# Patient Record
Sex: MALE | Race: OTHER | NOT HISPANIC OR LATINO | Employment: STUDENT | URBAN - METROPOLITAN AREA
[De-identification: names, ages, dates, MRNs, and addresses within clinical notes are randomized per-mention and may not be internally consistent; named-entity substitution may affect disease eponyms.]

---
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
## 2024-01-20 ENCOUNTER — APPOINTMENT (EMERGENCY)
Dept: RADIOLOGY | Facility: HOSPITAL | Age: 19
DRG: 720 | End: 2024-01-20
Payer: COMMERCIAL

## 2024-01-20 ENCOUNTER — HOSPITAL ENCOUNTER (INPATIENT)
Facility: HOSPITAL | Age: 19
LOS: 2 days | Discharge: HOME | DRG: 720 | End: 2024-01-22
Attending: EMERGENCY MEDICINE | Admitting: INTERNAL MEDICINE
Payer: COMMERCIAL

## 2024-01-20 DIAGNOSIS — R65.20 SEVERE SEPSIS (HCC): ICD-10-CM

## 2024-01-20 DIAGNOSIS — A41.9 SEVERE SEPSIS (HCC): ICD-10-CM

## 2024-01-20 DIAGNOSIS — E87.6 HYPOKALEMIA: ICD-10-CM

## 2024-01-20 DIAGNOSIS — J13 PNEUMONIA OF RIGHT MIDDLE LOBE DUE TO STREPTOCOCCUS PNEUMONIAE (HCC): ICD-10-CM

## 2024-01-20 DIAGNOSIS — J18.9 PNEUMONIA: Primary | ICD-10-CM

## 2024-01-20 LAB
ALBUMIN SERPL BCP-MCNC: 4.3 G/DL (ref 3.5–5)
ALP SERPL-CCNC: 89 U/L (ref 34–104)
ALT SERPL W P-5'-P-CCNC: 35 U/L (ref 7–52)
ANION GAP SERPL CALCULATED.3IONS-SCNC: 8 MMOL/L
APTT PPP: 30 SECONDS (ref 23–37)
AST SERPL W P-5'-P-CCNC: 23 U/L (ref 13–39)
BASOPHILS # BLD AUTO: 0.05 THOUSANDS/ÂΜL (ref 0–0.1)
BASOPHILS NFR BLD AUTO: 0 % (ref 0–1)
BILIRUB SERPL-MCNC: 0.81 MG/DL (ref 0.2–1)
BILIRUB UR QL STRIP: NEGATIVE
BUN SERPL-MCNC: 18 MG/DL (ref 5–25)
CALCIUM SERPL-MCNC: 8.8 MG/DL (ref 8.4–10.2)
CHLORIDE SERPL-SCNC: 107 MMOL/L (ref 96–108)
CLARITY UR: CLEAR
CO2 SERPL-SCNC: 22 MMOL/L (ref 21–32)
COLOR UR: NORMAL
CREAT SERPL-MCNC: 0.93 MG/DL (ref 0.6–1.3)
EOSINOPHIL # BLD AUTO: 0.01 THOUSAND/ÂΜL (ref 0–0.61)
EOSINOPHIL NFR BLD AUTO: 0 % (ref 0–6)
ERYTHROCYTE [DISTWIDTH] IN BLOOD BY AUTOMATED COUNT: 12.2 % (ref 11.6–15.1)
FLUAV RNA RESP QL NAA+PROBE: NEGATIVE
FLUBV RNA RESP QL NAA+PROBE: NEGATIVE
GFR SERPL CREATININE-BSD FRML MDRD: 117 ML/MIN/1.73SQ M
GLUCOSE SERPL-MCNC: 134 MG/DL (ref 65–140)
GLUCOSE UR STRIP-MCNC: NEGATIVE MG/DL
HCT VFR BLD AUTO: 37.1 % (ref 36.5–49.3)
HGB BLD-MCNC: 12.9 G/DL (ref 12–17)
HGB UR QL STRIP.AUTO: NEGATIVE
IMM GRANULOCYTES # BLD AUTO: 0.32 THOUSAND/UL (ref 0–0.2)
IMM GRANULOCYTES NFR BLD AUTO: 1 % (ref 0–2)
INR PPP: 1.2 (ref 0.84–1.19)
KETONES UR STRIP-MCNC: NEGATIVE MG/DL
LACTATE SERPL-SCNC: 0.9 MMOL/L (ref 0.5–2)
LEUKOCYTE ESTERASE UR QL STRIP: NEGATIVE
LYMPHOCYTES # BLD AUTO: 1.16 THOUSANDS/ÂΜL (ref 0.6–4.47)
LYMPHOCYTES NFR BLD AUTO: 5 % (ref 14–44)
MCH RBC QN AUTO: 30.8 PG (ref 26.8–34.3)
MCHC RBC AUTO-ENTMCNC: 34.8 G/DL (ref 31.4–37.4)
MCV RBC AUTO: 89 FL (ref 82–98)
MONOCYTES # BLD AUTO: 2.14 THOUSAND/ÂΜL (ref 0.17–1.22)
MONOCYTES NFR BLD AUTO: 10 % (ref 4–12)
NEUTROPHILS # BLD AUTO: 18.84 THOUSANDS/ÂΜL (ref 1.85–7.62)
NEUTS SEG NFR BLD AUTO: 84 % (ref 43–75)
NITRITE UR QL STRIP: NEGATIVE
NRBC BLD AUTO-RTO: 0 /100 WBCS
PH UR STRIP.AUTO: 7.5 [PH]
PLATELET # BLD AUTO: 389 THOUSANDS/UL (ref 149–390)
PMV BLD AUTO: 9.5 FL (ref 8.9–12.7)
POTASSIUM SERPL-SCNC: 3.2 MMOL/L (ref 3.5–5.3)
PROCALCITONIN SERPL-MCNC: 2.09 NG/ML
PROT SERPL-MCNC: 7.2 G/DL (ref 6.4–8.4)
PROT UR STRIP-MCNC: NEGATIVE MG/DL
PROTHROMBIN TIME: 15.5 SECONDS (ref 11.6–14.5)
RBC # BLD AUTO: 4.19 MILLION/UL (ref 3.88–5.62)
RSV RNA RESP QL NAA+PROBE: NEGATIVE
SARS-COV-2 RNA RESP QL NAA+PROBE: NEGATIVE
SODIUM SERPL-SCNC: 137 MMOL/L (ref 135–147)
SP GR UR STRIP.AUTO: 1.02 (ref 1–1.03)
UROBILINOGEN UR STRIP-ACNC: <2 MG/DL
WBC # BLD AUTO: 22.52 THOUSAND/UL (ref 4.31–10.16)

## 2024-01-20 PROCEDURE — 85730 THROMBOPLASTIN TIME PARTIAL: CPT | Performed by: PHYSICIAN ASSISTANT

## 2024-01-20 PROCEDURE — 85610 PROTHROMBIN TIME: CPT | Performed by: PHYSICIAN ASSISTANT

## 2024-01-20 PROCEDURE — 83605 ASSAY OF LACTIC ACID: CPT | Performed by: PHYSICIAN ASSISTANT

## 2024-01-20 PROCEDURE — 71046 X-RAY EXAM CHEST 2 VIEWS: CPT

## 2024-01-20 PROCEDURE — 80053 COMPREHEN METABOLIC PANEL: CPT | Performed by: PHYSICIAN ASSISTANT

## 2024-01-20 PROCEDURE — 87449 NOS EACH ORGANISM AG IA: CPT | Performed by: PHYSICIAN ASSISTANT

## 2024-01-20 PROCEDURE — 99285 EMERGENCY DEPT VISIT HI MDM: CPT

## 2024-01-20 PROCEDURE — 0241U HB NFCT DS VIR RESP RNA 4 TRGT: CPT | Performed by: PHYSICIAN ASSISTANT

## 2024-01-20 PROCEDURE — 96365 THER/PROPH/DIAG IV INF INIT: CPT

## 2024-01-20 PROCEDURE — 87040 BLOOD CULTURE FOR BACTERIA: CPT | Performed by: PHYSICIAN ASSISTANT

## 2024-01-20 PROCEDURE — 85025 COMPLETE CBC W/AUTO DIFF WBC: CPT | Performed by: PHYSICIAN ASSISTANT

## 2024-01-20 PROCEDURE — 99285 EMERGENCY DEPT VISIT HI MDM: CPT | Performed by: PHYSICIAN ASSISTANT

## 2024-01-20 PROCEDURE — 36415 COLL VENOUS BLD VENIPUNCTURE: CPT | Performed by: PHYSICIAN ASSISTANT

## 2024-01-20 PROCEDURE — 81003 URINALYSIS AUTO W/O SCOPE: CPT | Performed by: PHYSICIAN ASSISTANT

## 2024-01-20 PROCEDURE — 84145 PROCALCITONIN (PCT): CPT | Performed by: PHYSICIAN ASSISTANT

## 2024-01-20 PROCEDURE — 99223 1ST HOSP IP/OBS HIGH 75: CPT | Performed by: PHYSICIAN ASSISTANT

## 2024-01-20 RX ORDER — POTASSIUM CHLORIDE 20 MEQ/1
40 TABLET, EXTENDED RELEASE ORAL ONCE
Status: COMPLETED | OUTPATIENT
Start: 2024-01-20 | End: 2024-01-20

## 2024-01-20 RX ORDER — ONDANSETRON 2 MG/ML
4 INJECTION INTRAMUSCULAR; INTRAVENOUS EVERY 4 HOURS PRN
Status: DISCONTINUED | OUTPATIENT
Start: 2024-01-20 | End: 2024-01-22 | Stop reason: HOSPADM

## 2024-01-20 RX ORDER — CEFTRIAXONE 2 G/50ML
2000 INJECTION, SOLUTION INTRAVENOUS EVERY 24 HOURS
Status: DISCONTINUED | OUTPATIENT
Start: 2024-01-21 | End: 2024-01-22 | Stop reason: HOSPADM

## 2024-01-20 RX ORDER — IBUPROFEN 600 MG/1
600 TABLET ORAL EVERY 6 HOURS PRN
Status: DISCONTINUED | OUTPATIENT
Start: 2024-01-20 | End: 2024-01-22 | Stop reason: HOSPADM

## 2024-01-20 RX ORDER — ACETAMINOPHEN 325 MG/1
650 TABLET ORAL EVERY 6 HOURS PRN
Status: DISCONTINUED | OUTPATIENT
Start: 2024-01-20 | End: 2024-01-22 | Stop reason: HOSPADM

## 2024-01-20 RX ORDER — SODIUM CHLORIDE, SODIUM GLUCONATE, SODIUM ACETATE, POTASSIUM CHLORIDE, MAGNESIUM CHLORIDE, SODIUM PHOSPHATE, DIBASIC, AND POTASSIUM PHOSPHATE .53; .5; .37; .037; .03; .012; .00082 G/100ML; G/100ML; G/100ML; G/100ML; G/100ML; G/100ML; G/100ML
125 INJECTION, SOLUTION INTRAVENOUS CONTINUOUS
Status: DISPENSED | OUTPATIENT
Start: 2024-01-20 | End: 2024-01-21

## 2024-01-20 RX ORDER — CEFTRIAXONE 2 G/50ML
2000 INJECTION, SOLUTION INTRAVENOUS ONCE
Status: COMPLETED | OUTPATIENT
Start: 2024-01-20 | End: 2024-01-20

## 2024-01-20 RX ADMIN — CEFTRIAXONE 2000 MG: 2 INJECTION, SOLUTION INTRAVENOUS at 14:28

## 2024-01-20 RX ADMIN — IBUPROFEN 600 MG: 600 TABLET ORAL at 17:07

## 2024-01-20 RX ADMIN — SODIUM CHLORIDE 1000 ML: 0.9 INJECTION, SOLUTION INTRAVENOUS at 14:30

## 2024-01-20 RX ADMIN — SODIUM CHLORIDE 1000 ML: 0.9 INJECTION, SOLUTION INTRAVENOUS at 14:04

## 2024-01-20 RX ADMIN — SODIUM CHLORIDE, SODIUM GLUCONATE, SODIUM ACETATE, POTASSIUM CHLORIDE, MAGNESIUM CHLORIDE, SODIUM PHOSPHATE, DIBASIC, AND POTASSIUM PHOSPHATE 125 ML/HR: .53; .5; .37; .037; .03; .012; .00082 INJECTION, SOLUTION INTRAVENOUS at 17:16

## 2024-01-20 RX ADMIN — POTASSIUM CHLORIDE 40 MEQ: 1500 TABLET, EXTENDED RELEASE ORAL at 16:30

## 2024-01-20 NOTE — ASSESSMENT & PLAN NOTE
Presented to the emergency department with a fever of 105.8 at home as well as cough and body aches  Dx with flu last week, now flu/covid/rsv negative  Continue IV rocephin  Legionella, strep pneumo antigens ordered  Blood cultures ordered  Sputum cultures ordered

## 2024-01-20 NOTE — PLAN OF CARE
Problem: METABOLIC, FLUID AND ELECTROLYTES - ADULT  Goal: Electrolytes maintained within normal limits  Description: INTERVENTIONS:  - Monitor labs and assess patient for signs and symptoms of electrolyte imbalances  - Administer electrolyte replacement as ordered  - Monitor response to electrolyte replacements, including repeat lab results as appropriate  - Instruct patient on fluid and nutrition as appropriate  Outcome: Progressing  Goal: Fluid balance maintained  Description: INTERVENTIONS:  - Monitor labs   - Monitor I/O and WT  - Instruct patient on fluid and nutrition as appropriate  - Assess for signs & symptoms of volume excess or deficit  Outcome: Progressing     Problem: Knowledge Deficit  Goal: Patient/family/caregiver demonstrates understanding of disease process, treatment plan, medications, and discharge instructions  Description: Complete learning assessment and assess knowledge base.  Interventions:  - Provide teaching at level of understanding  - Provide teaching via preferred learning methods  Outcome: Progressing

## 2024-01-20 NOTE — DISCHARGE INSTRUCTIONS
DISCHARGE INSTRUCTIONS:    FOLLOW UP WITH YOUR PRIMARY CARE PROVIDER OR THE [facility]. MAKE AN APPOINTMENT TO BE SEEN.     TAKE TYLENOL OR MOTRIN FOR PAIN OR FEVER.    TAKE ANTIBIOTICS AS PRESCRIBED. IF RASH, SHORTNESS OF BREATH OR TROUBLE SWALLOWING, STOP TAKING THE MEDICATION AND BE SEEN.     REST AND DRINK PLENTY OF FLUIDS.    IF SYMPTOMS WORSEN OR NEW SYMPTOMS ARISE, RETURN TO THE ER TO BE SEEN.

## 2024-01-20 NOTE — ED PROVIDER NOTES
History  Chief Complaint   Patient presents with    Fever     Arrives with family from home. Pt started with fever of 105 at home. Given tylenol, but threw up 30 min later. Dx with flu last week. Generalized body aches/chills     18y.o male with no significant PMH presents to the ER for fever (max 105.8), chills, cough, one episode of vomiting and body aches for 2 days. Patient took Tylenol and Advil around 10:00 today. Cough produces sputum. Symptoms are constant. Patient was diagnosed with the flu last week but was feeling better until symptoms worsened. He has positive sick contacts. He denies rhinorrhea/congestion, sore throat, chest pain, dyspnea, nausea, diarrhea, abdominal pain, urinary symptoms, weakness or paresthesias.      History provided by:  Patient   used: No        None       History reviewed. No pertinent past medical history.    History reviewed. No pertinent surgical history.    History reviewed. No pertinent family history.  I have reviewed and agree with the history as documented.    E-Cigarette/Vaping     E-Cigarette/Vaping Substances     Social History     Tobacco Use    Smoking status: Passive Smoke Exposure - Never Smoker    Smokeless tobacco: Never   Substance Use Topics    Alcohol use: Never    Drug use: Yes     Types: Marijuana       Review of Systems   Constitutional:  Positive for chills and fever. Negative for appetite change.   HENT:  Negative for congestion, drooling, ear discharge, ear pain, facial swelling, rhinorrhea and sore throat.    Eyes:  Negative for redness.   Respiratory:  Positive for cough. Negative for shortness of breath.    Cardiovascular:  Negative for chest pain.   Gastrointestinal:  Positive for vomiting. Negative for abdominal pain, diarrhea and nausea.   Genitourinary:  Negative for dysuria, frequency, hematuria and urgency.   Musculoskeletal:  Positive for myalgias. Negative for neck stiffness.   Skin:  Negative for rash.    Allergic/Immunologic: Negative for food allergies.   Neurological:  Negative for weakness and numbness.       Physical Exam  Physical Exam  Vitals and nursing note reviewed.   Constitutional:       General: He is not in acute distress.     Appearance: He is ill-appearing. He is not toxic-appearing.   HENT:      Head: Normocephalic and atraumatic.      Mouth/Throat:      Lips: Pink. No lesions.      Mouth: Mucous membranes are moist.   Eyes:      Conjunctiva/sclera: Conjunctivae normal.   Neck:      Trachea: No tracheal deviation.   Cardiovascular:      Rate and Rhythm: Regular rhythm. Tachycardia present.      Heart sounds: Normal heart sounds, S1 normal and S2 normal. No murmur heard.     No friction rub. No gallop.   Pulmonary:      Effort: Pulmonary effort is normal. No respiratory distress.      Breath sounds: Normal breath sounds. No decreased breath sounds, wheezing, rhonchi or rales.   Chest:      Chest wall: No tenderness.   Abdominal:      General: Bowel sounds are normal. There is no distension.      Palpations: Abdomen is soft.      Tenderness: There is no abdominal tenderness. There is no guarding or rebound.   Musculoskeletal:      Cervical back: Normal range of motion and neck supple.   Skin:     General: Skin is warm and dry.      Findings: No rash.   Neurological:      Mental Status: He is alert.      GCS: GCS eye subscore is 4. GCS verbal subscore is 5. GCS motor subscore is 6.   Psychiatric:         Mood and Affect: Mood normal.         Vital Signs  ED Triage Vitals   Temperature Pulse Respirations Blood Pressure SpO2   01/20/24 1111 01/20/24 1111 01/20/24 1111 01/20/24 1111 01/20/24 1111   (!) 103.1 °F (39.5 °C) (!) 112 20 130/56 95 %      Temp Source Heart Rate Source Patient Position - Orthostatic VS BP Location FiO2 (%)   01/20/24 1111 01/20/24 1111 01/20/24 1111 01/20/24 1111 --   Oral Monitor Sitting Right arm       Pain Score       01/20/24 1707       Med Not Given for Pain - for MAR use  only           Vitals:    01/20/24 1515 01/20/24 1545 01/20/24 1600 01/20/24 1648   BP: 97/53 103/61 98/56 118/59   Pulse: 80 102 92 97   Patient Position - Orthostatic VS:    Lying         Visual Acuity      ED Medications  Medications   acetaminophen (TYLENOL) tablet 650 mg (has no administration in time range)   cefTRIAXone (ROCEPHIN) IVPB (premix in dextrose) 2,000 mg 50 mL (has no administration in time range)   multi-electrolyte (PLASMALYTE-A/ISOLYTE-S PH 7.4) IV solution (125 mL/hr Intravenous New Bag 1/20/24 1716)   ibuprofen (MOTRIN) tablet 600 mg (600 mg Oral Given 1/20/24 1707)   ondansetron (ZOFRAN) injection 4 mg (has no administration in time range)   cefTRIAXone (ROCEPHIN) IVPB (premix in dextrose) 2,000 mg 50 mL (0 mg Intravenous Stopped 1/20/24 1458)   sodium chloride 0.9 % bolus 1,000 mL (0 mL Intravenous Stopped 1/20/24 1504)   sodium chloride 0.9 % bolus 1,000 mL (0 mL Intravenous Stopped 1/20/24 1530)   potassium chloride (K-DUR,KLOR-CON) CR tablet 40 mEq (40 mEq Oral Given 1/20/24 1630)       Diagnostic Studies  Results Reviewed       Procedure Component Value Units Date/Time    UA w Reflex to Microscopic w Reflex to Culture [[id number]] Collected: 01/20/24 1626    Lab Status: Final result Specimen: Urine, Clean Catch Updated: 01/20/24 1904     Color, UA Light Yellow     Clarity, UA Clear     Specific Gravity, UA 1.017     pH, UA 7.5     Leukocytes, UA Negative     Nitrite, UA Negative     Protein, UA Negative mg/dl      Glucose, UA Negative mg/dl      Ketones, UA Negative mg/dl      Urobilinogen, UA <2.0 mg/dl      Bilirubin, UA Negative     Occult Blood, UA Negative    Strep Pneumoniae, Urine [[id number]] Collected: 01/20/24 1625    Lab Status: In process Specimen: Urine, Clean Catch Updated: 01/20/24 1630    Legionella antigen, urine [[id number]] Collected: 01/20/24 1625    Lab Status: In process Specimen: Urine, Clean Catch Updated: 01/20/24 1629    Protime-INR [[id number]]  (Abnormal)  Collected: 01/20/24 1400    Lab Status: Final result Specimen: Blood from Arm, Right Updated: 01/20/24 1503     Protime 15.5 seconds      INR 1.20    APTT [[id number]]  (Normal) Collected: 01/20/24 1400    Lab Status: Final result Specimen: Blood from Arm, Right Updated: 01/20/24 1503     PTT 30 seconds     Procalcitonin [[id number]]  (Abnormal) Collected: 01/20/24 1400    Lab Status: Final result Specimen: Blood from Arm, Right Updated: 01/20/24 1449     Procalcitonin 2.09 ng/ml     Comprehensive metabolic panel [[id number]]  (Abnormal) Collected: 01/20/24 1400    Lab Status: Final result Specimen: Blood from Arm, Right Updated: 01/20/24 1442     Sodium 137 mmol/L      Potassium 3.2 mmol/L      Chloride 107 mmol/L      CO2 22 mmol/L      ANION GAP 8 mmol/L      BUN 18 mg/dL      Creatinine 0.93 mg/dL      Glucose 134 mg/dL      Calcium 8.8 mg/dL      AST 23 U/L      ALT 35 U/L      Alkaline Phosphatase 89 U/L      Total Protein 7.2 g/dL      Albumin 4.3 g/dL      Total Bilirubin 0.81 mg/dL      eGFR 117 ml/min/1.73sq m     Narrative:      National Kidney Disease Foundation guidelines for Chronic Kidney Disease (CKD):     Stage 1 with normal or high GFR (GFR > 90 mL/min/1.73 square meters)    Stage 2 Mild CKD (GFR = 60-89 mL/min/1.73 square meters)    Stage 3A Moderate CKD (GFR = 45-59 mL/min/1.73 square meters)    Stage 3B Moderate CKD (GFR = 30-44 mL/min/1.73 square meters)    Stage 4 Severe CKD (GFR = 15-29 mL/min/1.73 square meters)    Stage 5 End Stage CKD (GFR <15 mL/min/1.73 square meters)  Note: GFR calculation is accurate only with a steady state creatinine    Lactic acid [[id number]]  (Normal) Collected: 01/20/24 1400    Lab Status: Final result Specimen: Blood from Arm, Right Updated: 01/20/24 1441     LACTIC ACID 0.9 mmol/L     Narrative:      Result may be elevated if tourniquet was used during collection.    CBC and differential [[id number]]  (Abnormal) Collected: 01/20/24 1406    Lab Status: Final result  Specimen: Blood Updated: 01/20/24 1424     WBC 22.52 Thousand/uL      RBC 4.19 Million/uL      Hemoglobin 12.9 g/dL      Hematocrit 37.1 %      MCV 89 fL      MCH 30.8 pg      MCHC 34.8 g/dL      RDW 12.2 %      MPV 9.5 fL      Platelets 389 Thousands/uL      nRBC 0 /100 WBCs      Neutrophils Relative 84 %      Immat GRANS % 1 %      Lymphocytes Relative 5 %      Monocytes Relative 10 %      Eosinophils Relative 0 %      Basophils Relative 0 %      Neutrophils Absolute 18.84 Thousands/µL      Immature Grans Absolute 0.32 Thousand/uL      Lymphocytes Absolute 1.16 Thousands/µL      Monocytes Absolute 2.14 Thousand/µL      Eosinophils Absolute 0.01 Thousand/µL      Basophils Absolute 0.05 Thousands/µL     Blood culture #2 [[id number]] Collected: 01/20/24 1416    Lab Status: In process Specimen: Blood from Arm, Left Updated: 01/20/24 1420    Blood culture #1 [[id number]] Collected: 01/20/24 1400    Lab Status: In process Specimen: Blood from Arm, Right Updated: 01/20/24 1420    FLU/RSV/COVID - if FLU/RSV clinically relevant [[id number]]  (Normal) Collected: 01/20/24 1139    Lab Status: Final result Specimen: Nares from Nose Updated: 01/20/24 1224     SARS-CoV-2 Negative     INFLUENZA A PCR Negative     INFLUENZA B PCR Negative     RSV PCR Negative    Narrative:      FOR PEDIATRIC PATIENTS - copy/paste COVID Guidelines URL to browser: [email or web address]    SARS-CoV-2 assay is a Nucleic Acid Amplification assay intended for the  qualitative detection of nucleic acid from SARS-CoV-2 in nasopharyngeal  swabs. Results are for the presumptive identification of SARS-CoV-2 RNA.    Positive results are indicative of infection with SARS-CoV-2, the virus  causing COVID-19, but do not rule out bacterial infection or co-infection  with other viruses. Laboratories within the [place] and its  territories are required to report all positive results to the appropriate  public  health authorities. Negative results do not preclude SARS-CoV-2  infection and should not be used as the sole basis for treatment or other  patient management decisions. Negative results must be combined with  clinical observations, patient history, and epidemiological information.  This test has not been FDA cleared or approved.    This test has been authorized by FDA under an Emergency Use Authorization  (EUA). This test is only authorized for the duration of time the  declaration that circumstances exist justifying the authorization of the  emergency use of an in vitro diagnostic tests for detection of SARS-CoV-2  virus and/or diagnosis of COVID-19 infection under section 564(b)(1) of  the Act, 21 U.S.C. 360bbb-3(b)(1), unless the authorization is terminated  or revoked sooner. The test has been validated but independent review by FDA  and CLIA is pending.    Test performed using Cepheid GeneXpert: This RT-PCR assay targets N2,  a region unique to SARS-CoV-2. A conserved region in the E-gene was chosen  for pan-Sarbecovirus detection which includes SARS-CoV-2.    According to CMS-2020-01-R, this platform meets the definition of high-throughput technology.                   XR chest 2 views   ED Interpretation by Diana Guadalupe PA-C (01/20 1229)   RLL pneumonia seen by me                 Procedures  Procedures         ED Course  ED Course as of 01/20/24 1945   Sat Jan 20, 2024   1125 Offered labs and imaging for symptoms. Patient and family state they do not have insurance and want the least amount of testing to be done. After speaking with family, they are agreeable to covid/flu/rsv test and CXR for now. Will re-evaluate the situation once testing returns.    1331 Informed patient of covid/flu/rsv test and CXR findings of pneumonia. Patient reports feeling much better now that his temperature is coming down. Patient is tolerating gatorade in the room. Will recheck vitals in a little and ambulate patient.    1358 Blood Pressure(!): 74/51  RN attempted to obtain blood pressure and have patient ambulate. Patient became hypotensive and dizzy. He also became tachycardic. Family and patient now agreeable to labs and fluids. Will obtain.   1416 Septic work up ordered along with fluids and antibiotics. Will give patient 30mL/kg of fluids, which equals 1956mL when calculated.    1424 WBC(!): 22.52   1424 Hemoglobin: 12.9   1424 Platelet Count: 389   1442 LACTIC ACID: 0.9   1458 Procalcitonin(!): 2.09   1546 Informed patient and family of lab and imaging findings. Patient and family agreeable to plan for admission. [place] text sent to [facility].   1555 Sepsis alert called. Work up already in process. Antibiotics already given along with fluids.     1558 Potassium(!): 3.2  Will replete.                            Initial Sepsis Screening       Row Name 01/20/24 1530 01/20/24 1458 01/20/24 1424          Is the patient's history suggestive of a new or worsening infection? Yes (Proceed)  -AR Yes (Proceed)  -AR Yes (Proceed)  -AR      Suspected source of infection pneumonia  -AR pneumonia  -AR pneumonia  -AR      Indicate SIRS criteria Hyperthemia > 38.3C (100.9F) OR Hypothermia <36C (96.8F);Tachycardia > 90 bpm;Leukocytosis (WBC > 95052 IJL) OR Leukopenia (WBC <4000 IJL) OR Bandemia (WBC >10% bands)  -AR Hyperthemia > 38.3C (100.9F) OR Hypothermia <36C (96.8F);Tachycardia > 90 bpm;Leukocytosis (WBC > 89571 IJL) OR Leukopenia (WBC <4000 IJL) OR Bandemia (WBC >10% bands)  -AR Hyperthemia > 38.3C (100.9F) OR Hypothermia <36C (96.8F);Tachycardia > 90 bpm;Leukocytosis (WBC > 52239 IJL) OR Leukopenia (WBC <4000 IJL) OR Bandemia (WBC >10% bands)  -AR      Are two or more of the above signs & symptoms of infection both present and new to the patient? Yes (Proceed)  -AR Yes (Proceed)  -AR Yes (Proceed)  -AR      Assess for evidence of organ dysfunction: Are any of the below criteria present within 6 hours of suspected infection and SIRS criteria  "that are NOT considered to be chronic conditions? SBP < 90  -AR SBP < 90  -AR --      Date of presentation of septic shock -- 01/20/24  -AR --      Time of presentation of septic shock -- 1458  -AR --      Fluid Resuscitation: 30 ml/kg IV fluid bolus will be given based on actual body weight  -AR 30 ml/kg IV fluid bolus will be given based on actual body weight  -AR --      Is the patient is persistently hypotensive in the hour after fluid bolus administration? If yes, patient meets criteria for vasopressor use. NO  -AR -- --      Sepsis Note: Click \"NEXT\" below (NOT \"close\") to generate sepsis note based on above information. YES (proceed by clicking \"NEXT\")  -AR -- --                User Key  (r) = Recorded By, (t) = Taken By, (c) = Cosigned By      Initials Name Provider Type    CARLOS Guadalupe PA-C Physician Assistant                  Default Flowsheet Data (last 720 hours)       Sepsis Reassess       Row Name 01/20/24 1530                   Repeat Volume Status and Tissue Perfusion Assessment Performed    Date of Reassessment: 01/20/24  -AR        Time of Reassessment: 1530  -AR        Sepsis Reassessment Note: Click \"NEXT\" below (NOT \"close\") to generate sepsis reassessment note. YES (proceed by clicking \"NEXT\")  -AR        Repeat Volume Status and Tissue Perfusion Assessment Performed --                  User Key  (r) = Recorded By, (t) = Taken By, (c) = Cosigned By      Initials Name Provider Type    CARLOS Guadalupe PA-C Physician Assistant                                Medical Decision Making  18y.o male presents to the ER for fever (max 105.8), chills, cough, one episode of vomiting and body aches for 2 days. Patient is tachycardic but also has a fever, which is most likely why he is tachycardic. Otherwise vitals are stable. Patient is in no acute distress. On exam, breathing is non-labored. No tachypnea or accessory muscle use. Lungs are clear. Heart is regular rhythm. Abdomen is soft " and non-tender. No guarding, rigidity, distention or pulsatile masses palpated. Moist mucous membranes seen. Offered labs and imaging. Patient and family state they do not have insurance and want the least amount of testing to be done. After speaking with family, they are agreeable to covid/flu/rsv test and CXR for now. Will re-evaluate the situation once testing returns.     1331 Informed patient of covid/flu/rsv test and CXR findings of pneumonia. Patient reports feeling much better now that his temperature is coming down. Patient is tolerating gatorade in the room. Will recheck vitals in a little and ambulate patient.    1358 Blood Pressure(!): 74/51 - RN attempted to obtain blood pressure and have patient ambulate. Patient became hypotensive and dizzy. He also became tachycardic. Family and patient now agreeable to labs and fluids. Will obtain.    1416 Septic work up ordered along with fluids and antibiotics. Will give patient 30mL/kg of fluids, which equals 1956mL when calculated, due to his episode of hypotension.     1424 WBC(!): 22.52    1424 Hemoglobin: 12.9    1424 Platelet Count: 389    1442 LACTIC ACID: 0.9    1458 Procalcitonin(!): 2.09    1546 Informed patient and family of lab and imaging findings. Patient and family agreeable to plan for admission. [place] text sent to [facility].    1555 Sepsis alert called. Work up already in process. Antibiotics already given along with fluids. Spoke with Dr. Yepez from [facility] who is agreeable to admission.    1558 Potassium(!): 3.2 - Will replete.    Patient stable at time of transfer to [facility].           Problems Addressed:  Hypokalemia: acute illness or injury  Pneumonia: acute illness or injury  Severe sepsis (HCC): acute illness or injury    Amount and/or Complexity of Data Reviewed  Independent Historian: parent     Details: Patient and family are historians  Labs: ordered. Decision-making details documented in ED Course.  Radiology: ordered and independent  interpretation performed.    Risk  Prescription drug management.  Decision regarding hospitalization.             Disposition  Final diagnoses:   Pneumonia   Hypokalemia   Severe sepsis (HCC)     Time reflects when diagnosis was documented in both MDM as applicable and the Disposition within this note       Time User Action Codes Description Comment    1/20/2024  1:23 PM Diana Guadalupe Add [J18.9] Pneumonia     1/20/2024  3:58 PM Diana Guadalupe Add [E87.6] Hypokalemia     1/20/2024  3:59 PM Diana Guadalupe Add [A41.9,  R65.20] Severe sepsis (HCC)           ED Disposition       ED Disposition   Admit    Condition   Stable    Date/Time   Sat Jan 20, 2024  3:58 PM    Comment   Case was discussed with Dr. Yepez from [facility] and the patient's admission status was agreed to be Admission Status: inpatient status to the service of Dr. Yepez .               Follow-up Information       Follow up With Specialties Details Why Contact Info    Husam Garcia MD Pediatrics Schedule an appointment as soon as possible for a visit   [address] [place] [address] [id number] [phone]              There are no discharge medications for this patient.      No discharge procedures on file.    PDMP Review       None            ED Provider  Electronically Signed by             Diana Guadalupe PA-C  01/20/24 8104

## 2024-01-20 NOTE — ASSESSMENT & PLAN NOTE
Present on admission, as evidenced by leukocytosis, tachycardia, fever, and pneumonia  Without lactic acidosis, no evidence of endorgan damage  Sepsis fluid protocol followed by the emergency department staff  Continue IV fluids overnight  Initiated on IV Rocephin, will continue at this time  Procalcitonin elevated at 2.09, follow-up with repeat in a.m.  Blood cultures obtained and pending  Strep pneumo and legionella antigens ordered

## 2024-01-20 NOTE — SEPSIS NOTE
"  Sepsis Note   Jourdan Martinez 18 y.o. male MRN: [id number]  Unit/Bed#: ED-32 Encounter: [id number]       Initial Sepsis Screening       Row Name 01/20/24 1530 01/20/24 1458 01/20/24 1424          Is the patient's history suggestive of a new or worsening infection? Yes (Proceed)  -AR Yes (Proceed)  -AR Yes (Proceed)  -AR      Suspected source of infection pneumonia  -AR pneumonia  -AR pneumonia  -AR      Indicate SIRS criteria Hyperthemia > 38.3C (100.9F) OR Hypothermia <36C (96.8F);Tachycardia > 90 bpm;Leukocytosis (WBC > 95905 IJL) OR Leukopenia (WBC <4000 IJL) OR Bandemia (WBC >10% bands)  -AR Hyperthemia > 38.3C (100.9F) OR Hypothermia <36C (96.8F);Tachycardia > 90 bpm;Leukocytosis (WBC > 98916 IJL) OR Leukopenia (WBC <4000 IJL) OR Bandemia (WBC >10% bands)  -AR Hyperthemia > 38.3C (100.9F) OR Hypothermia <36C (96.8F);Tachycardia > 90 bpm;Leukocytosis (WBC > 53608 IJL) OR Leukopenia (WBC <4000 IJL) OR Bandemia (WBC >10% bands)  -AR      Are two or more of the above signs & symptoms of infection both present and new to the patient? Yes (Proceed)  -AR Yes (Proceed)  -AR Yes (Proceed)  -AR      Assess for evidence of organ dysfunction: Are any of the below criteria present within 6 hours of suspected infection and SIRS criteria that are NOT considered to be chronic conditions? SBP < 90  -AR SBP < 90  -AR --      Date of presentation of septic shock -- 01/20/24  -AR --      Time of presentation of septic shock -- 1458  -AR --      Fluid Resuscitation: 30 ml/kg IV fluid bolus will be given based on actual body weight  -AR 30 ml/kg IV fluid bolus will be given based on actual body weight  -AR --      Is the patient is persistently hypotensive in the hour after fluid bolus administration? If yes, patient meets criteria for vasopressor use. NO  -AR -- --      Sepsis Note: Click \"NEXT\" below (NOT \"close\") to generate sepsis note based on above information. YES (proceed by clicking \"NEXT\")  -AR -- --                User " Key  (r) = Recorded By, (t) = Taken By, (c) = Cosigned By      Initials Name Provider Type    LIEN DalyC Physician Assistant                        There is no height or weight on file to calculate BMI.  Wt Readings from Last 1 Encounters:   01/20/24 65.2 kg (143 lb 11.8 oz)        Height is required to calculate ideal body weight.

## 2024-01-20 NOTE — LETTER
[facility] 2  [address] [place] [address]  Dept: [phone]    January 22, 2024     Patient: Jourdan Martinez   YOB: 2005   Date of Visit: 1/20/2024       To Whom it May Concern:    Jourdan Martinez is under my professional care. He was seen in the hospital from 1/20/2024 to 01/22/24. He may return to work on 1/26/24 without limitations.    If you have any questions or concerns, please don't hesitate to call.         Sincerely,          Andrea Verde PA-C

## 2024-01-20 NOTE — LETTER
[facility] 2  [address] [address]  Dept: [phone]    January 22, 2024     Patient: Jourdan Martinez   YOB: 2005   Date of Visit: 1/20/2024       To Whom it May Concern:    Jourdan Martinez is under my professional care. He was seen in the hospital from 1/20/2024 to 01/22/24. He {Return to school/sport/work:29220}.    If you have any questions or concerns, please don't hesitate to call.         Sincerely,          Andrea Verde PA-C

## 2024-01-20 NOTE — H&P
[facility]  H&P  Name: Jourdan Martinez 18 y.o. male I MRN: [id number]  Unit/Bed#: ED-32 I Date of Admission: 1/20/2024   Date of Service: 1/20/2024 I Hospital Day: 0      Assessment/Plan   * Sepsis (HCC)  Assessment & Plan  Present on admission, as evidenced by leukocytosis, tachycardia, fever, and pneumonia  Without lactic acidosis, no evidence of endorgan damage  Sepsis fluid protocol followed by the emergency department staff  Continue IV fluids overnight  Initiated on IV Rocephin, will continue at this time  Procalcitonin elevated at 2.09, follow-up with repeat in a.m.  Blood cultures obtained and pending  Strep pneumo and legionella antigens ordered    Hypokalemia  Assessment & Plan  Potassium 3.2  Replete and recheck    Pneumonia  Assessment & Plan  Presented to the emergency department with a fever of 105.8 at home as well as cough and body aches  Dx with flu last week, now flu/covid/rsv negative  Continue IV rocephin  Legionella, strep pneumo antigens ordered  Blood cultures ordered  Sputum cultures ordered       VTE Pharmacologic Prophylaxis: VTE Score: 0 Low Risk (Score 0-2) - Encourage Ambulation.  Code Status: No Order Full code  Discussion with family: Updated  (mother) at bedside.    Anticipated Length of Stay: Patient will be admitted on an inpatient basis with an anticipated length of stay of greater than 2 midnights secondary to sepsis.    Total Time Spent on Date of Encounter in care of patient: 45 mins. This time was spent on one or more of the following: performing physical exam; counseling and coordination of care; obtaining or reviewing history; documenting in the medical record; reviewing/ordering tests, medications or procedures; communicating with other healthcare professionals and discussing with patient's family/caregivers.    Chief Complaint: chills, fever, cough    History of Present Illness:  Jourdan Martinez is a 18 y.o. male with no known past  medical history who presents with fevers, chills, cough. History mostly obtained from mother who is at bedside as the patient is sleeping. The patient reports being diagnosed with the flu 2 weeks ago. He improved and was able to go to work this past week, he then woke up this AM with a fever of 105. His mother attempted to help him out of bed when he got extremely weak and vomited. He was then brought to the ED for further evaluation. He was found to meet sepsis criteria and admitted for further IV fluids and  IV ABX.    Review of Systems:  Review of Systems   Constitutional:  Positive for chills, fatigue and fever.   HENT:  Negative for ear pain and sore throat.    Eyes:  Negative for pain and visual disturbance.   Respiratory:  Positive for cough. Negative for shortness of breath.    Cardiovascular:  Negative for chest pain and palpitations.   Gastrointestinal:  Negative for abdominal pain and vomiting.   Genitourinary:  Negative for dysuria and hematuria.   Musculoskeletal:  Negative for arthralgias and back pain.   Skin:  Negative for color change and rash.   Neurological:  Positive for weakness. Negative for seizures and syncope.       Past Medical and Surgical History:   History reviewed. No pertinent past medical history.    History reviewed. No pertinent surgical history.    Meds/Allergies:  Prior to Admission medications    Not on File     I have reviewed home medications with patient personally.    Allergies: No Known Allergies    Social History:  Marital Status: Single   Occupation: Unknown  Patient Pre-hospital Living Situation: Home  Patient Pre-hospital Level of Mobility: walks  Patient Pre-hospital Diet Restrictions: n/a  Substance Use History:   Social History     Substance and Sexual Activity   Alcohol Use Never     Social History     Tobacco Use   Smoking Status Passive Smoke Exposure - Never Smoker   Smokeless Tobacco Never     Social History     Substance and Sexual Activity   Drug Use Yes     Types: Marijuana       Family History:  History reviewed. No pertinent family history.    Physical Exam:     Vitals:   Blood Pressure: 98/56 (01/20/24 1600)  Pulse: 92 (01/20/24 1600)  Temperature: 99.2 °F (37.3 °C) (01/20/24 1511)  Temp Source: Oral (01/20/24 1511)  Respirations: 18 (01/20/24 1345)  Weight - Scale: 65.2 kg (143 lb 11.8 oz) (01/20/24 1111)  SpO2: 98 % (01/20/24 1600)    Physical Exam  Vitals and nursing note reviewed.   Constitutional:       General: He is not in acute distress.     Appearance: Normal appearance. He is ill-appearing. He is not toxic-appearing or diaphoretic.   HENT:      Head: Normocephalic and atraumatic.   Cardiovascular:      Rate and Rhythm: Normal rate and regular rhythm.      Heart sounds: No murmur heard.     No friction rub. No gallop.   Pulmonary:      Effort: Pulmonary effort is normal. No respiratory distress.      Breath sounds: No wheezing, rhonchi or rales.      Comments: Course breath sounds in right lung field  Abdominal:      General: Abdomen is flat. Bowel sounds are normal. There is no distension.      Palpations: Abdomen is soft.      Tenderness: There is no abdominal tenderness.   Musculoskeletal:      Right lower leg: No edema.      Left lower leg: No edema.   Skin:     General: Skin is warm and dry.      Coloration: Skin is not jaundiced or pale.   Neurological:      General: No focal deficit present.      Mental Status: He is alert. Mental status is at baseline.          Additional Data:     Lab Results:  Results from last 7 days   Lab Units 01/20/24  1406   WBC Thousand/uL 22.52*   HEMOGLOBIN g/dL 12.9   HEMATOCRIT % 37.1   PLATELETS Thousands/uL 389   NEUTROS PCT % 84*   LYMPHS PCT % 5*   MONOS PCT % 10   EOS PCT % 0     Results from last 7 days   Lab Units 01/20/24  1400   SODIUM mmol/L 137   POTASSIUM mmol/L 3.2*   CHLORIDE mmol/L 107   CO2 mmol/L 22   BUN mg/dL 18   CREATININE mg/dL 0.93   ANION GAP mmol/L 8   CALCIUM mg/dL 8.8   ALBUMIN g/dL 4.3   TOTAL  BILIRUBIN mg/dL 0.81   ALK PHOS U/L 89   ALT U/L 35   AST U/L 23   GLUCOSE RANDOM mg/dL 134     Results from last 7 days   Lab Units 01/20/24  1400   INR  1.20*             Results from last 7 days   Lab Units 01/20/24  1400   LACTIC ACID mmol/L 0.9   PROCALCITONIN ng/ml 2.09*       Lines/Drains:  Invasive Devices       Peripheral Intravenous Line  Duration             Peripheral IV 01/20/24 Dorsal (posterior);Right Forearm <1 day    Peripheral IV 01/20/24 Left;Proximal;Ventral (anterior) Forearm <1 day                        Imaging: Reviewed radiology reports from this admission including: chest xray  XR chest 2 views   ED Interpretation by Diana Guadalupe PA-C (01/20 1229)   RLL pneumonia seen by me          EKG and Other Studies Reviewed on Admission:   EKG: No EKG obtained.    ** Please Note: This note has been constructed using a voice recognition system. **

## 2024-01-20 NOTE — LETTER
[facility] 2  [address] [place] [address]  Dept: [phone]    January 22, 2024     Patient: Jourdan Martinez   YOB: 2005   Date of Visit: 1/20/2024       To Whom it May Concern:    Jourdan Martinez is under my professional care. He was seen in the hospital from 1/20/2024 to 01/22/24. He may return to work on 1/26/24 without limitations.    If you have any questions or concerns, please don't hesitate to call.         Sincerely,          Andrea Verde PA-C             Patient is requesting a call. Patient stated that she is going on vacation in the beginning of September. Patient stated that it is required for her to be fully vaccinated. Patient wants to know what she could do since she had a allergic reaction to the first dose and was told not to get the second dose.     Patient Name: Mary Her  Caller Name: Mary  Callback Number: [phone]  Best Availability: any   Did you confirm the message with the caller?: yes    Thank you,  James Mcginnis

## 2024-01-21 PROBLEM — E87.6 HYPOKALEMIA: Status: RESOLVED | Noted: 2024-01-20 | Resolved: 2024-01-21

## 2024-01-21 PROBLEM — J13 PNEUMONIA DUE TO STREPTOCOCCUS PNEUMONIAE (HCC): Status: ACTIVE | Noted: 2024-01-20

## 2024-01-21 LAB
ANION GAP SERPL CALCULATED.3IONS-SCNC: 4 MMOL/L
BASOPHILS # BLD AUTO: 0.04 THOUSANDS/ÂΜL (ref 0–0.1)
BASOPHILS NFR BLD AUTO: 0 % (ref 0–1)
BUN SERPL-MCNC: 16 MG/DL (ref 5–25)
CALCIUM SERPL-MCNC: 8 MG/DL (ref 8.4–10.2)
CHLORIDE SERPL-SCNC: 112 MMOL/L (ref 96–108)
CO2 SERPL-SCNC: 23 MMOL/L (ref 21–32)
CREAT SERPL-MCNC: 0.9 MG/DL (ref 0.6–1.3)
EOSINOPHIL # BLD AUTO: 0.03 THOUSAND/ÂΜL (ref 0–0.61)
EOSINOPHIL NFR BLD AUTO: 0 % (ref 0–6)
ERYTHROCYTE [DISTWIDTH] IN BLOOD BY AUTOMATED COUNT: 12.3 % (ref 11.6–15.1)
GFR SERPL CREATININE-BSD FRML MDRD: 122 ML/MIN/1.73SQ M
GLUCOSE SERPL-MCNC: 98 MG/DL (ref 65–140)
HCT VFR BLD AUTO: 33.9 % (ref 36.5–49.3)
HGB BLD-MCNC: 11.2 G/DL (ref 12–17)
IMM GRANULOCYTES # BLD AUTO: 0.17 THOUSAND/UL (ref 0–0.2)
IMM GRANULOCYTES NFR BLD AUTO: 1 % (ref 0–2)
L PNEUMO1 AG UR QL IA.RAPID: NEGATIVE
LYMPHOCYTES # BLD AUTO: 2.8 THOUSANDS/ÂΜL (ref 0.6–4.47)
LYMPHOCYTES NFR BLD AUTO: 14 % (ref 14–44)
MCH RBC QN AUTO: 29.9 PG (ref 26.8–34.3)
MCHC RBC AUTO-ENTMCNC: 33 G/DL (ref 31.4–37.4)
MCV RBC AUTO: 91 FL (ref 82–98)
MONOCYTES # BLD AUTO: 1.25 THOUSAND/ÂΜL (ref 0.17–1.22)
MONOCYTES NFR BLD AUTO: 6 % (ref 4–12)
NEUTROPHILS # BLD AUTO: 16.01 THOUSANDS/ÂΜL (ref 1.85–7.62)
NEUTS SEG NFR BLD AUTO: 79 % (ref 43–75)
NRBC BLD AUTO-RTO: 0 /100 WBCS
PLATELET # BLD AUTO: 334 THOUSANDS/UL (ref 149–390)
PMV BLD AUTO: 10.4 FL (ref 8.9–12.7)
POTASSIUM SERPL-SCNC: 3.9 MMOL/L (ref 3.5–5.3)
PROCALCITONIN SERPL-MCNC: 3.71 NG/ML
RBC # BLD AUTO: 3.74 MILLION/UL (ref 3.88–5.62)
S PNEUM AG UR QL: POSITIVE
SODIUM SERPL-SCNC: 139 MMOL/L (ref 135–147)
WBC # BLD AUTO: 20.3 THOUSAND/UL (ref 4.31–10.16)

## 2024-01-21 PROCEDURE — 84145 PROCALCITONIN (PCT): CPT | Performed by: PHYSICIAN ASSISTANT

## 2024-01-21 PROCEDURE — 85025 COMPLETE CBC W/AUTO DIFF WBC: CPT | Performed by: PHYSICIAN ASSISTANT

## 2024-01-21 PROCEDURE — 80048 BASIC METABOLIC PNL TOTAL CA: CPT | Performed by: PHYSICIAN ASSISTANT

## 2024-01-21 PROCEDURE — 99231 SBSQ HOSP IP/OBS SF/LOW 25: CPT | Performed by: PHYSICIAN ASSISTANT

## 2024-01-21 RX ADMIN — CEFTRIAXONE 2000 MG: 2 INJECTION, SOLUTION INTRAVENOUS at 13:09

## 2024-01-21 RX ADMIN — SODIUM CHLORIDE, SODIUM GLUCONATE, SODIUM ACETATE, POTASSIUM CHLORIDE, MAGNESIUM CHLORIDE, SODIUM PHOSPHATE, DIBASIC, AND POTASSIUM PHOSPHATE 125 ML/HR: .53; .5; .37; .037; .03; .012; .00082 INJECTION, SOLUTION INTRAVENOUS at 08:41

## 2024-01-21 NOTE — ASSESSMENT & PLAN NOTE
Presented to the emergency department with a fever of 105.8 at home as well as cough and body aches  Dx with flu last week, now flu/covid/rsv negative  Continue IV rocephin, will transition to oral antibiotic therapy tomorrow  strep pneumo antigen positive  Blood cultures ordered and pending  Sputum cultures ordered

## 2024-01-21 NOTE — PLAN OF CARE
Problem: METABOLIC, FLUID AND ELECTROLYTES - ADULT  Goal: Electrolytes maintained within normal limits  Description: INTERVENTIONS:  - Monitor labs and assess patient for signs and symptoms of electrolyte imbalances  - Administer electrolyte replacement as ordered  - Monitor response to electrolyte replacements, including repeat lab results as appropriate  - Instruct patient on fluid and nutrition as appropriate  Outcome: Progressing  Goal: Fluid balance maintained  Description: INTERVENTIONS:  - Monitor labs   - Monitor I/O and WT  - Instruct patient on fluid and nutrition as appropriate  - Assess for signs & symptoms of volume excess or deficit  Outcome: Progressing     Problem: DISCHARGE PLANNING  Goal: Discharge to home or other facility with appropriate resources  Description: INTERVENTIONS:  - Identify barriers to discharge w/patient and caregiver  - Arrange for needed discharge resources and transportation as appropriate  - Identify discharge learning needs (meds, wound care, etc.)  - Arrange for interpretive services to assist at discharge as needed  - Refer to Case Management Department for coordinating discharge planning if the patient needs post-hospital services based on physician/advanced practitioner order or complex needs related to functional status, cognitive ability, or social support system  Outcome: Progressing     Problem: Knowledge Deficit  Goal: Patient/family/caregiver demonstrates understanding of disease process, treatment plan, medications, and discharge instructions  Description: Complete learning assessment and assess knowledge base.  Interventions:  - Provide teaching at level of understanding  - Provide teaching via preferred learning methods  Outcome: Progressing

## 2024-01-21 NOTE — PROGRESS NOTES
[facility]  Progress Note  Name: Jourdan Martinez I  MRN: [id number]  Unit/Bed#: E2 -01 I Date of Admission: 1/20/2024   Date of Service: 1/21/2024 I Hospital Day: 1    Assessment/Plan   * Sepsis (HCC)  Assessment & Plan  Present on admission, as evidenced by leukocytosis, tachycardia, fever, and pneumonia  Without lactic acidosis, no evidence of endorgan damage  Sepsis fluid protocol followed by the emergency department staff  Continue IV fluids until this afternoon  Initiated on IV Rocephin, will continue at this time give pneumococcal pneumonia  Procalcitonin elevated at 2.09, repeat 3.71  Blood cultures obtained and pending  Step pneumo antigens positive  WBC count trending down from 22.5--> 20.3    Pneumonia due to Streptococcus pneumoniae (HCC)  Assessment & Plan  Presented to the emergency department with a fever of 105.8 at home as well as cough and body aches  Dx with flu last week, now flu/covid/rsv negative  Continue IV rocephin, will transition to oral antibiotic therapy tomorrow  strep pneumo antigen positive  Blood cultures ordered and pending  Sputum cultures ordered    Hypokalemia-resolved as of 1/21/2024  Assessment & Plan  Potassium 3.2  Replete and recheck  resolved           VTE Pharmacologic Prophylaxis: VTE Score: 0 Low Risk (Score 0-2) - Encourage Ambulation.    Mobility:   Basic Mobility Inpatient Raw Score: 24  JH-HLM Goal: 8: Walk 250 feet or more  JH-HLM Achieved: 7: Walk 25 feet or more  HLM Goal achieved. Continue to encourage appropriate mobility.    Patient Centered Rounds: I performed bedside rounds with nursing staff today.   Discussions with Specialists or Other Care Team Provider: None    Education and Discussions with Family / Patient: Updated  (father, mother, and sister) at bedside.    Total Time Spent on Date of Encounter in care of patient: 35 mins. This time was spent on one or more of the following: performing physical exam;  counseling and coordination of care; obtaining or reviewing history; documenting in the medical record; reviewing/ordering tests, medications or procedures; communicating with other healthcare professionals and discussing with patient's family/caregivers.    Current Length of Stay: 1 day(s)  Current Patient Status: Inpatient   Certification Statement: The patient will continue to require additional inpatient hospital stay due to blood cultures pending  Discharge Plan: Anticipate discharge tomorrow to home.    Code Status: Level 1 - Full Code    Subjective:   The patient is seen resting comfortably in bed.  He is eating breakfast, he is much more awake and alert than he was when I evaluated him yesterday in the emergency department.  He reports that he is feeling much improved and he has his energy back.  He reports a continued cough, denies any fevers or chills overnight.    Objective:     Vitals:   Temp (24hrs), Av.6 °F (37.6 °C), Min:97.3 °F (36.3 °C), Max:103.1 °F (39.5 °C)    Temp:  [97.3 °F (36.3 °C)-103.1 °F (39.5 °C)] 98.2 °F (36.8 °C)  HR:  [] 94  Resp:  [16-22] 16  BP: ()/(46-61) 109/53  SpO2:  [95 %-100 %] 96 %  Body mass index is 18.36 kg/m².     Input and Output Summary (last 24 hours):     Intake/Output Summary (Last 24 hours) at 2024 0928  Last data filed at 2024 0841  Gross per 24 hour   Intake 3977.08 ml   Output --   Net 3977.08 ml       Physical Exam:   Physical Exam  Vitals and nursing note reviewed.   Constitutional:       General: He is not in acute distress.     Appearance: Normal appearance. He is not ill-appearing, toxic-appearing or diaphoretic.   HENT:      Head: Normocephalic and atraumatic.   Cardiovascular:      Rate and Rhythm: Normal rate and regular rhythm.      Heart sounds: No murmur heard.     No friction rub. No gallop.   Pulmonary:      Effort: Pulmonary effort is normal. No respiratory distress.      Breath sounds: Normal breath sounds. No wheezing,  rhonchi or rales.   Abdominal:      General: Abdomen is flat. Bowel sounds are normal. There is no distension.      Palpations: Abdomen is soft.      Tenderness: There is no abdominal tenderness.   Musculoskeletal:      Right lower leg: No edema.      Left lower leg: No edema.   Skin:     General: Skin is warm and dry.      Coloration: Skin is not jaundiced or pale.   Neurological:      General: No focal deficit present.      Mental Status: He is alert. Mental status is at baseline.          Additional Data:     Labs:  Results from last 7 days   Lab Units 01/21/24  0436   WBC Thousand/uL 20.30*   HEMOGLOBIN g/dL 11.2*   HEMATOCRIT % 33.9*   PLATELETS Thousands/uL 334   NEUTROS PCT % 79*   LYMPHS PCT % 14   MONOS PCT % 6   EOS PCT % 0     Results from last 7 days   Lab Units 01/21/24  0436 01/20/24  1400   SODIUM mmol/L 139 137   POTASSIUM mmol/L 3.9 3.2*   CHLORIDE mmol/L 112* 107   CO2 mmol/L 23 22   BUN mg/dL 16 18   CREATININE mg/dL 0.90 0.93   ANION GAP mmol/L 4 8   CALCIUM mg/dL 8.0* 8.8   ALBUMIN g/dL  --  4.3   TOTAL BILIRUBIN mg/dL  --  0.81   ALK PHOS U/L  --  89   ALT U/L  --  35   AST U/L  --  23   GLUCOSE RANDOM mg/dL 98 134     Results from last 7 days   Lab Units 01/20/24  1400   INR  1.20*             Results from last 7 days   Lab Units 01/21/24  0436 01/20/24  1400   LACTIC ACID mmol/L  --  0.9   PROCALCITONIN ng/ml 3.71* 2.09*       Lines/Drains:  Invasive Devices       Peripheral Intravenous Line  Duration             Peripheral IV 01/20/24 Dorsal (posterior);Right Forearm <1 day    Peripheral IV 01/20/24 Left;Proximal;Ventral (anterior) Forearm <1 day                          Imaging: Reviewed radiology reports from this admission including: chest xray    Recent Cultures (last 7 days):   Results from last 7 days   Lab Units 01/20/24  1625 01/20/24  1416 01/20/24  1400   BLOOD CULTURE   --  Received in Microbiology Lab. Culture in Progress. Received in Microbiology Lab. Culture in Progress.    LEGIONELLA URINARY ANTIGEN  Negative  --   --        Last 24 Hours Medication List:   Current Facility-Administered Medications   Medication Dose Route Frequency Provider Last Rate    acetaminophen  650 mg Oral Q6H PRN Andrea Verde PA-C      cefTRIAXone  2,000 mg Intravenous Q24H Andrea Verde PA-C      ibuprofen  600 mg Oral Q6H PRN Andrea Verde PA-C      multi-electrolyte  125 mL/hr Intravenous Continuous Andrea Verde PA-C 125 mL/hr (01/21/24 0841)    ondansetron  4 mg Intravenous Q4H PRN Andrea Verde PA-C          Today, Patient Was Seen By: Andrea Verde PA-C    **Please Note: This note may have been constructed using a voice recognition system.**

## 2024-01-21 NOTE — ASSESSMENT & PLAN NOTE
Present on admission, as evidenced by leukocytosis, tachycardia, fever, and pneumonia  Without lactic acidosis, no evidence of endorgan damage  Sepsis fluid protocol followed by the emergency department staff  Continue IV fluids until this afternoon  Initiated on IV Rocephin, will continue at this time give pneumococcal pneumonia  Procalcitonin elevated at 2.09, repeat 3.71  Blood cultures obtained and pending  Step pneumo antigens positive  WBC count trending down from 22.5--> 20.3

## 2024-01-22 VITALS
RESPIRATION RATE: 18 BRPM | HEIGHT: 74 IN | WEIGHT: 143 LBS | OXYGEN SATURATION: 98 % | DIASTOLIC BLOOD PRESSURE: 57 MMHG | HEART RATE: 86 BPM | TEMPERATURE: 97.6 F | SYSTOLIC BLOOD PRESSURE: 126 MMHG | BODY MASS INDEX: 18.35 KG/M2

## 2024-01-22 LAB
ANION GAP SERPL CALCULATED.3IONS-SCNC: 4 MMOL/L
BUN SERPL-MCNC: 11 MG/DL (ref 5–25)
CALCIUM SERPL-MCNC: 8.7 MG/DL (ref 8.4–10.2)
CHLORIDE SERPL-SCNC: 111 MMOL/L (ref 96–108)
CO2 SERPL-SCNC: 23 MMOL/L (ref 21–32)
CREAT SERPL-MCNC: 0.87 MG/DL (ref 0.6–1.3)
ERYTHROCYTE [DISTWIDTH] IN BLOOD BY AUTOMATED COUNT: 12.2 % (ref 11.6–15.1)
GFR SERPL CREATININE-BSD FRML MDRD: 124 ML/MIN/1.73SQ M
GLUCOSE SERPL-MCNC: 119 MG/DL (ref 65–140)
HCT VFR BLD AUTO: 36.4 % (ref 36.5–49.3)
HGB BLD-MCNC: 12.4 G/DL (ref 12–17)
MCH RBC QN AUTO: 30.2 PG (ref 26.8–34.3)
MCHC RBC AUTO-ENTMCNC: 34.1 G/DL (ref 31.4–37.4)
MCV RBC AUTO: 89 FL (ref 82–98)
PLATELET # BLD AUTO: 365 THOUSANDS/UL (ref 149–390)
PMV BLD AUTO: 9.6 FL (ref 8.9–12.7)
POTASSIUM SERPL-SCNC: 4 MMOL/L (ref 3.5–5.3)
RBC # BLD AUTO: 4.1 MILLION/UL (ref 3.88–5.62)
SODIUM SERPL-SCNC: 138 MMOL/L (ref 135–147)
WBC # BLD AUTO: 11.38 THOUSAND/UL (ref 4.31–10.16)

## 2024-01-22 PROCEDURE — 80048 BASIC METABOLIC PNL TOTAL CA: CPT | Performed by: PHYSICIAN ASSISTANT

## 2024-01-22 PROCEDURE — 85027 COMPLETE CBC AUTOMATED: CPT | Performed by: PHYSICIAN ASSISTANT

## 2024-01-22 PROCEDURE — 99239 HOSP IP/OBS DSCHRG MGMT >30: CPT | Performed by: PHYSICIAN ASSISTANT

## 2024-01-22 RX ORDER — CEFPODOXIME PROXETIL 200 MG/1
200 TABLET, FILM COATED ORAL 2 TIMES DAILY
Qty: 10 TABLET | Refills: 0 | Status: SHIPPED | OUTPATIENT
Start: 2024-01-22 | End: 2024-01-27

## 2024-01-22 NOTE — ASSESSMENT & PLAN NOTE
Present on admission, as evidenced by leukocytosis, tachycardia, fever, and pneumonia  Without lactic acidosis, no evidence of endorgan damage  Sepsis fluid protocol followed by the emergency department staff  Continue IV fluids until this afternoon  Received IV rocephin while inpatient  Switch to oral cefpodoxime for an additional 5 days to complete a full 7 days of antibiotic therapy  Procalcitonin elevated at 2.09, repeat 3.71  Blood cultures obtained and negative at 24 hours  Step pneumo antigens positive  WBC count trending down from 22.5--> 20.3--> 11.3

## 2024-01-22 NOTE — UTILIZATION REVIEW
Initial Clinical Review    Admission: Date/Time/Statement:   Admission Orders (From admission, onward)       Ordered        01/20/24 1559  INPATIENT ADMISSION  Once                          Orders Placed This Encounter   Procedures    INPATIENT ADMISSION     Standing Status:   Standing     Number of Occurrences:   1     Order Specific Question:   Level of Care     Answer:   Med Surg [16]     Order Specific Question:   Estimated length of stay     Answer:   More than 2 Midnights     Order Specific Question:   Certification     Answer:   I certify that inpatient services are medically necessary for this patient for a duration of greater than two midnights. See H&P and MD Progress Notes for additional information about the patient's course of treatment.     ED Arrival Information       Expected   -    Arrival   1/20/2024 10:56    Acuity   Urgent              Means of arrival   Walk-In    Escorted by   Family Member    Service   Hospitalist    Admission type   Emergency              Arrival complaint   vomiting             Chief Complaint   Patient presents with    Fever     Arrives with family from home. Pt started with fever of 105 at home. Given tylenol, but threw up 30 min later. Dx with flu last week. Generalized body aches/chills       Initial Presentation:   18y.o male with no significant PMH presents to the ER from home for fever (max 105.8), chills, cough, one episode of vomiting and body aches for 2 days. Patient took Tylenol and Advil around 10:00 today. Cough produces sputum. Symptoms are constant. Patient was diagnosed with the flu last week but was feeling better until symptoms worsened. He has positive sick contacts. Presents febrile, tachycardic, s/s as above. Admission work-up showing  pneumonia on imaging, leukocytosis, elevated procalcitonin, +strep.   Admitted to inpatient status for sepsis. Started on IVABT, IVF, cultures pending.        ED Triage Vitals   Temperature Pulse Respirations Blood  Pressure SpO2   01/20/24 1111 01/20/24 1111 01/20/24 1111 01/20/24 1111 01/20/24 1111   (!) 103.1 °F (39.5 °C) (!) 112 20 130/56 95 %      Temp Source Heart Rate Source Patient Position - Orthostatic VS BP Location FiO2 (%)   01/20/24 1111 01/20/24 1111 01/20/24 1111 01/20/24 1111 --   Oral Monitor Sitting Right arm       Pain Score       01/20/24 1707       Med Not Given for Pain - for MAR use only          Wt Readings from Last 1 Encounters:   01/20/24 64.9 kg (143 lb)     Additional Vital Signs:   Date/Time Temp Pulse Resp BP MAP (mmHg) SpO2 O2 Device Patient Position - Orthostatic VS   01/22/24 0803 97.6 °F (36.4 °C) 86 18 126/57 82 98 % None (Room air) Lying   01/21/24 2210 97.8 °F (36.6 °C) 75 16 105/54 77 98 % None (Room air) Lying   01/21/24 1519 98 °F (36.7 °C) 84 16 104/55 75 97 % None (Room air) Lying   01/21/24 0804 98.2 °F (36.8 °C) 94 16 109/53 77 96 % None (Room air) Lying   01/21/24 0246 98.1 °F (36.7 °C) -- -- -- -- -- -- --   01/20/24 2338 -- -- -- 98/54 -- -- -- Lying   01/20/24 2317 97.3 °F (36.3 °C) Abnormal  77 18 -- -- 96 % None (Room air) Lying   01/20/24 1648 99.8 °F (37.7 °C) 97 18 118/59 78 97 % None (Room air) Lying   01/20/24 1600 -- 92 -- 98/56 75 98 % None (Room air) --   01/20/24 1545 -- 102 -- 103/61 76 98 % -- --   01/20/24 1515 -- 80 -- 97/53 70 97 % None (Room air) --   01/20/24 1511 99.2 °F (37.3 °C) -- -- -- -- -- -- --   01/20/24 1500 -- 86 -- 95/51 67 97 % -- --   01/20/24 1445 -- 88 -- 94/51 67 97 % None (Room air) --   01/20/24 1428 -- 90 -- 97/56 72 98 % -- --   01/20/24 1348 -- 86 -- 108/52 75 96 % -- --   01/20/24 1345 -- 88 18 108/52 -- 96 % None (Room air) Lying   01/20/24 1342 -- 122 Abnormal  22 74/51 Abnormal  -- -- -- Standing for 3 minutes - Orthostatic VS   01/20/24 1340 -- 124 Abnormal  16 92/59 -- -- -- Standing - Orthostatic VS   01/20/24 1339 -- 110 Abnormal  16 106/59 -- -- -- Sitting - Orthostatic VS   01/20/24 1338 -- 100 16 101/52 -- 97 % None (Room air)  Lying - Orthostatic VS   01/20/24 1251 101.8 °F (38.8 °C) Abnormal  100 18 96/46 Abnormal   -- 100 % None (Room air) --   BP: pt sleeping and lying flat at 01/20/24 1251   01/20/24 1111 103.1 °F (39.5 °C) Abnormal  112 Abnormal  20 130/56 -- 95 % None (Room air) Sitting     Pertinent Labs/Diagnostic Test Results:   XR chest 2 views   ED Interpretation (01/20 1229)   RLL pneumonia seen by me      Final Result (01/20 2158)      Right middle lobe pneumonia.       Results from last 7 days   Lab Units 01/20/24  1139   SARS-COV-2  Negative     Results from last 7 days   Lab Units 01/22/24  0506 01/21/24  0436 01/20/24  1406   WBC Thousand/uL 11.38* 20.30* 22.52*   HEMOGLOBIN g/dL 12.4 11.2* 12.9   HEMATOCRIT % 36.4* 33.9* 37.1   PLATELETS Thousands/uL 365 334 389   NEUTROS ABS Thousands/µL  --  16.01* 18.84*     Results from last 7 days   Lab Units 01/22/24  0506 01/21/24  0436 01/20/24  1400   SODIUM mmol/L 138 139 137   POTASSIUM mmol/L 4.0 3.9 3.2*   CHLORIDE mmol/L 111* 112* 107   CO2 mmol/L 23 23 22   ANION GAP mmol/L 4 4 8   BUN mg/dL 11 16 18   CREATININE mg/dL 0.87 0.90 0.93   EGFR ml/min/1.73sq m 124 122 117   CALCIUM mg/dL 8.7 8.0* 8.8     Results from last 7 days   Lab Units 01/20/24  1400   AST U/L 23   ALT U/L 35   ALK PHOS U/L 89   TOTAL PROTEIN g/dL 7.2   ALBUMIN g/dL 4.3   TOTAL BILIRUBIN mg/dL 0.81     Results from last 7 days   Lab Units 01/22/24  0506 01/21/24  0436 01/20/24  1400   GLUCOSE RANDOM mg/dL 119 98 134     Results from last 7 days   Lab Units 01/20/24  1400   PROTIME seconds 15.5*   INR  1.20*   PTT seconds 30     Results from last 7 days   Lab Units 01/21/24  0436 01/20/24  1400   PROCALCITONIN ng/ml 3.71* 2.09*     Results from last 7 days   Lab Units 01/20/24  1400   LACTIC ACID mmol/L 0.9       Results from last 7 days   Lab Units 01/20/24  1626   CLARITY UA  Clear   COLOR UA  Light Yellow   SPEC GRAV UA  1.017   PH UA  7.5   GLUCOSE UA mg/dl Negative   KETONES UA mg/dl Negative   BLOOD  UA  Negative   PROTEIN UA mg/dl Negative   NITRITE UA  Negative   BILIRUBIN UA  Negative   UROBILINOGEN UA (BE) mg/dl <2.0   LEUKOCYTES UA  Negative     Results from last 7 days   Lab Units 01/20/24  1625 01/20/24  1139   STREP PNEUMONIAE ANTIGEN, URINE  Positive*  --    LEGIONELLA URINARY ANTIGEN  Negative  --    INFLUENZA A PCR   --  Negative   INFLUENZA B PCR   --  Negative   RSV PCR   --  Negative     Results from last 7 days   Lab Units 01/20/24  1416 01/20/24  1400   BLOOD CULTURE  No Growth at 24 hrs. No Growth at 24 hrs.       ED Treatment:   Medication Administration from 01/20/2024 1056 to 01/20/2024 1647         Date/Time Order Dose Route Action     01/20/2024 1428 EST cefTRIAXone (ROCEPHIN) IVPB (premix in dextrose) 2,000 mg 50 mL 2,000 mg Intravenous New Bag     01/20/2024 1404 EST sodium chloride 0.9 % bolus 1,000 mL 1,000 mL Intravenous New Bag     01/20/2024 1430 EST sodium chloride 0.9 % bolus 1,000 mL 1,000 mL Intravenous New Bag     01/20/2024 1630 EST potassium chloride (K-DUR,KLOR-CON) CR tablet 40 mEq 40 mEq Oral Given          History reviewed. No pertinent past medical history.  Present on Admission:   Sepsis (HCC)   Pneumonia due to Streptococcus pneumoniae (HCC)   (Resolved) Hypokalemia      Admitting Diagnosis: Hypokalemia [E87.6]  Vomiting [R11.10]  Pneumonia [J18.9]  Severe sepsis (HCC) [A41.9, R65.20]  Age/Sex: 18 y.o. male  Admission Orders:  Incentive spirometry    Scheduled Medications:  Medications 01/13 01/14 01/15 01/16 01/17 01/18 01/19 01/20 01/21 01/22   cefTRIAXone (ROCEPHIN) IVPB (premix in dextrose) 2,000 mg 50 mL  Dose: 2,000 mg  Freq: Every 24 hours Route: IV  Last Dose: 2,000 mg (01/21/24 1309)  Start: 01/21/24 1400   Admin Instructions:      Order specific questions:               1302      1400        cefTRIAXone (ROCEPHIN) IVPB (premix in dextrose) 2,000 mg 50 mL  Dose: 2,000 mg  Freq: Once Route: IV  Last Dose: Stopped (01/20/24 1458)  Start: 01/20/24 1415 End:  01/20/24 1458   Admin Instructions:      Order specific questions:              1428     1458          potassium chloride (K-DUR,KLOR-CON) CR tablet 40 mEq  Dose: 40 mEq  Freq: Once Route: PO  Start: 01/20/24 1600 End: 01/20/24 1630   Admin Instructions:              1630          sodium chloride 0.9 % bolus 1,000 mL  Dose: 1,000 mL  Freq: Once Route: IV  Last Dose: Stopped (01/20/24 1530)  Start: 01/20/24 1430 End: 01/20/24 1530           1430     1530          sodium chloride 0.9 % bolus 1,000 mL  Dose: 1,000 mL  Freq: Once Route: IV  Last Dose: Stopped (01/20/24 1504)  Start: 01/20/24 1415 End: 01/20/24 1504           1404     1504          Legend:       [id number]/1301/1401/1501/1601/1701/1801/1901/2001/2101/22        Continuous Meds Sorted by Name  for MichelleJourdan as of 01/22/24 1059  Legend:       Medications 01/13 01/14 01/15 01/16 01/17 01/18 01/19 01/20 01/21 01/22   multi-electrolyte (PLASMALYTE-A/ISOLYTE-S PH 7.4) IV solution  Rate: 125 mL/hr Dose: 125 mL/hr  Freq: Continuous Route: IV  Indications of Use: IV Hydration  Last Dose: Stopped (01/21/24 1308)  Start: 01/20/24 1700 End: 01/21/24 1231           1716      0841     1231-D/C'd  1308                     PRN Meds Sorted by Name  for Jourdan Martinez as of 01/22/24 1059  Legend:         Medications 01/13 01/14 01/15 01/16 01/17 01/18 01/19 01/20 01/21 01/22   acetaminophen (TYLENOL) tablet 650 mg  Dose: 650 mg  Freq: Every 6 hours PRN Route: PO  PRN Reasons: mild pain,headaches,fever  Indications of Use: FEVER,HEADACHE,MILD PAIN  Start: 01/20/24 1654                ibuprofen (MOTRIN) tablet 600 mg  Dose: 600 mg  Freq: Every 6 hours PRN Route: PO  PRN Reasons: mild pain,fever,headaches  Start: 01/20/24 1630   Admin Instructions:              1707          ondansetron (ZOFRAN) injection 4 mg  Dose: 4 mg  Freq: Every 4 hours PRN Route: IV  PRN Reason: nausea  Start: 01/20/24 1630   Admin Instructions:                       Network Utilization  Review Department  ATTENTION: Please call with any questions or concerns to [phone] and carefully listen to the prompts so that you are directed to the right person. All voicemails are confidential.   For Discharge needs, contact Care Management DC Support Team at [phone] opt. 2  Send all requests for admission clinical reviews, approved or denied determinations and any other requests to dedicated fax number below belonging to the campus where the patient is receiving treatment. List of dedicated fax numbers for the Facilities:  FACILITY NAME UR FAX NUMBER   ADMISSION DENIALS (Administrative/Medical Necessity) [phone]   DISCHARGE SUPPORT TEAM (NETWORK) [phone]   PARENT CHILD HEALTH (Maternity/NICU/Pediatrics) [phone]   [facility] [phone]   [facility] [phone]   [facility] [phone]   [facility] [phone]   [facility] [phone]   [facility] [phone]   [facility] [phone]   [facility] [phone]   [facility] [phone]   [facility] [phone]   [facility] [phone]

## 2024-01-22 NOTE — CASE MANAGEMENT
"   Case Management Discharge Planning Note    Patient name Jourdan Martinez  Location [facility] /E2 -* MRN [id number]  : 2005 Date 2024       Current Admission Date: 2024  Current Admission Diagnosis:Sepsis (HCC)   Patient Active Problem List    Diagnosis Date Noted    Sepsis (HCC) 2024    Pneumonia due to Streptococcus pneumoniae (HCC) 2024      LOS (days): 2  Geometric Mean LOS (GMLOS) (days):   Days to GMLOS:     OBJECTIVE:  Risk of Unplanned Readmission Score: 5.03         Current admission status: Inpatient   Preferred Pharmacy:   UNKNOWN - FOLLOW UP PRIOR TO DISCHARGE TO E-PRESCRIBE  No address on file      [place], [place] - [address],  [address],  Ground Floor [facility] [address]  Phone: [phone] Fax: [phone]    Primary Care Provider: Husam Garcia MD    Primary Insurance: B[employer] HENSLEY PENDING  Secondary Insurance:     DISCHARGE DETAILS:                                                                                                 Additional Comments: Per hospital financial counselors\" Pts has 100% discount. Referred to paths as well.\"                      "

## 2024-01-22 NOTE — DISCHARGE SUMMARY
Counts include 234 beds at the [facility]  Discharge- Jourdan Martinez 2005, 18 y.o. male MRN: [id number]  Unit/Bed#: E2 -01 Encounter: [id number]  Primary Care Provider: Husam Garcia MD   Date and time admitted to hospital: 1/20/2024 11:13 AM    * Sepsis (HCC)  Assessment & Plan  Present on admission, as evidenced by leukocytosis, tachycardia, fever, and pneumonia  Without lactic acidosis, no evidence of endorgan damage  Sepsis fluid protocol followed by the emergency department staff  Continue IV fluids until this afternoon  Received IV rocephin while inpatient  Switch to oral cefpodoxime for an additional 5 days to complete a full 7 days of antibiotic therapy  Procalcitonin elevated at 2.09, repeat 3.71  Blood cultures obtained and negative at 24 hours  Step pneumo antigens positive  WBC count trending down from 22.5--> 20.3--> 11.3    Pneumonia due to Streptococcus pneumoniae (HCC)  Assessment & Plan  Presented to the emergency department with a fever of 105.8 at home as well as cough and body aches  Dx with flu last week, now flu/covid/rsv negative  Continue IV rocephin, switch to oral cefpodoxime for an additional 5 days to complete a full 7 days of antibiotic therapy  strep pneumo antigen positive  Blood cultures ordered and negative      Medical Problems       Resolved Problems  Date Reviewed: 1/22/2024            Resolved    Hypokalemia 1/21/2024     Resolved by  Andrea Verde PA-C        Discharging Physician / Practitioner: Andrea Verde PA-C  PCP: Husam Garcia MD  Admission Date:   Admission Orders (From admission, onward)       Ordered        01/20/24 1559  INPATIENT ADMISSION  Once                          Discharge Date: 01/22/24    Consultations During Hospital Stay:  None    Procedures Performed:   None    Significant Findings / Test Results:   XR chest 2 views  Result Date: 1/20/2024    Impression: Right middle lobe pneumonia. Workstation performed: [id number]   "    Incidental Findings:   None     Test Results Pending at Discharge (will require follow up):   None     Outpatient Tests Requested:  None    Complications:  None    Reason for Admission: Sepsis    Hospital Course:   Jourdan Martinez is a 18 y.o. male patient with no known past medical history who originally presented to the hospital on 1/20/2024 due to fever, body aches.  Upon arrival to the emergency department, patient was found to meet sepsis criteria with leukocytosis, tachycardia, fever, and right middle lobe pneumonia.  During his hospitalization, he was treated with IV fluids as well as IV Rocephin for pneumonia.  Strep pneumo antigens ultimately were positive and he was continued with IV Rocephin.  He improved significantly during his hospitalization and will be discharged on oral cefpodoxime for an additional 5 days to complete a full 7 days of antibiotic therapy.    Please see above list of diagnoses and related plan for additional information.     Condition at Discharge: stable    Discharge Day Visit / Exam:   Subjective: The patient is seen resting comfortably in bed.  He is eager to go home today.  He is requesting a work note.  Return precautions were discussed at length with the patient and the patient's family at bedside.  Vitals: Blood Pressure: 126/57 (01/22/24 0803)  Pulse: 86 (01/22/24 0803)  Temperature: 97.6 °F (36.4 °C) (01/22/24 0803)  Temp Source: Temporal (01/22/24 0803)  Respirations: 18 (01/22/24 0803)  Height: 6' 2\" (188 cm) (01/20/24 1648)  Weight - Scale: 64.9 kg (143 lb) (01/20/24 1648)  SpO2: 98 % (01/22/24 0803)  Exam:   Physical Exam  Vitals and nursing note reviewed.   Constitutional:       General: He is not in acute distress.     Appearance: Normal appearance. He is not ill-appearing, toxic-appearing or diaphoretic.   HENT:      Head: Normocephalic and atraumatic.   Cardiovascular:      Rate and Rhythm: Normal rate and regular rhythm.      Heart sounds: No murmur heard.     No " friction rub. No gallop.   Pulmonary:      Effort: Pulmonary effort is normal. No respiratory distress.      Breath sounds: Normal breath sounds. No wheezing, rhonchi or rales.   Abdominal:      General: Abdomen is flat. Bowel sounds are normal. There is no distension.      Palpations: Abdomen is soft.      Tenderness: There is no abdominal tenderness.   Musculoskeletal:      Right lower leg: No edema.      Left lower leg: No edema.   Skin:     General: Skin is warm and dry.      Coloration: Skin is not jaundiced or pale.   Neurological:      General: No focal deficit present.      Mental Status: He is alert. Mental status is at baseline.          Discussion with Family: Updated  (mother) at bedside.    Discharge instructions/Information to patient and family:   See after visit summary for information provided to patient and family.      Provisions for Follow-Up Care:  See after visit summary for information related to follow-up care and any pertinent home health orders.      Mobility at time of Discharge:   Basic Mobility Inpatient Raw Score: 24  JH-HLM Goal: 8: Walk 250 feet or more  JH-HLM Achieved: 8: Walk 250 feet ot more  HLM Goal achieved. Continue to encourage appropriate mobility.     Disposition:   Home    Planned Readmission: n/a     Discharge Statement:  I spent 45 minutes discharging the patient. This time was spent on the day of discharge. I had direct contact with the patient on the day of discharge. Greater than 50% of the total time was spent examining patient, answering all patient questions, arranging and discussing plan of care with patient as well as directly providing post-discharge instructions.  Additional time then spent on discharge activities.    Discharge Medications:  See after visit summary for reconciled discharge medications provided to patient and/or family.      **Please Note: This note may have been constructed using a voice recognition system**

## 2024-01-22 NOTE — CASE MANAGEMENT
Case Management Discharge Planning Note    Patient name Jourdan Martinez  Location [facility] /E2 -* MRN [id number]  : 2005 Date 2024       Current Admission Date: 2024  Current Admission Diagnosis:Sepsis (HCC)   Patient Active Problem List    Diagnosis Date Noted    Sepsis (HCC) 2024    Pneumonia due to Streptococcus pneumoniae (HCC) 2024      LOS (days): 2  Geometric Mean LOS (GMLOS) (days):   Days to GMLOS:     OBJECTIVE:  Risk of Unplanned Readmission Score: 4.96         Current admission status: Inpatient   Preferred Pharmacy:   UNKNOWN - FOLLOW UP PRIOR TO DISCHARGE TO E-PRESCRIBE  No address on file      [place], [place] - [address],  [address],  [address]ound Floor [facility] [address]  Phone: [phone] Fax: [phone]    Primary Care Provider: Husam Garcia MD    Primary Insurance:   Secondary Insurance:     DISCHARGE DETAILS:                                          Other Referral/Resources/Interventions Provided:  Financial Resources Provided: Financial Counselor (Email sent to financial counselors to complete Search [place] for elligibility and PATHs referral.)

## 2024-01-22 NOTE — ASSESSMENT & PLAN NOTE
Presented to the emergency department with a fever of 105.8 at home as well as cough and body aches  Dx with flu last week, now flu/covid/rsv negative  Continue IV rocephin, switch to oral cefpodoxime for an additional 5 days to complete a full 7 days of antibiotic therapy  strep pneumo antigen positive  Blood cultures ordered and negative

## 2024-01-22 NOTE — PLAN OF CARE
Problem: METABOLIC, FLUID AND ELECTROLYTES - ADULT  Goal: Electrolytes maintained within normal limits  Description: INTERVENTIONS:  - Monitor labs and assess patient for signs and symptoms of electrolyte imbalances  - Administer electrolyte replacement as ordered  - Monitor response to electrolyte replacements, including repeat lab results as appropriate  - Instruct patient on fluid and nutrition as appropriate  Outcome: Progressing  Goal: Fluid balance maintained  Description: INTERVENTIONS:  - Monitor labs   - Monitor I/O and WT  - Instruct patient on fluid and nutrition as appropriate  - Assess for signs & symptoms of volume excess or deficit  Outcome: Progressing     Problem: Knowledge Deficit  Goal: Patient/family/caregiver demonstrates understanding of disease process, treatment plan, medications, and discharge instructions  Description: Complete learning assessment and assess knowledge base.  Interventions:  - Provide teaching at level of understanding  - Provide teaching via preferred learning methods  Outcome: Progressing     Problem: DISCHARGE PLANNING  Goal: Discharge to home or other facility with appropriate resources  Description: INTERVENTIONS:  - Identify barriers to discharge w/patient and caregiver  - Arrange for needed discharge resources and transportation as appropriate  - Identify discharge learning needs (meds, wound care, etc.)  - Arrange for interpretive services to assist at discharge as needed  - Refer to Case Management Department for coordinating discharge planning if the patient needs post-hospital services based on physician/advanced practitioner order or complex needs related to functional status, cognitive ability, or social support system  Outcome: Progressing

## 2024-01-22 NOTE — RESTORATIVE TECHNICIAN NOTE
Restorative Technician Note      Patient Name: Jourdan Martinez     Restorative Tech Visit Date: 01/22/24  Note Type: Mobility  Patient Position Upon Consult: Supine  Activity Performed: Ambulated  Patient Position at End of Consult: Supine; All needs within reach

## 2024-01-26 LAB
BACTERIA BLD CULT: NORMAL
BACTERIA BLD CULT: NORMAL

## 2024-02-21 PROBLEM — J13 PNEUMONIA DUE TO STREPTOCOCCUS PNEUMONIAE (HCC): Status: RESOLVED | Noted: 2024-01-20 | Resolved: 2024-02-21

## 2024-02-21 PROBLEM — A41.9 SEPSIS (HCC): Status: RESOLVED | Noted: 2024-01-20 | Resolved: 2024-02-21

## 2024-09-20 ENCOUNTER — HOSPITAL ENCOUNTER (EMERGENCY)
Facility: HOSPITAL | Age: 19
Discharge: HOME | End: 2024-09-20
Attending: EMERGENCY MEDICINE

## 2024-09-20 ENCOUNTER — APPOINTMENT (EMERGENCY)
Dept: ULTRASOUND IMAGING | Facility: HOSPITAL | Age: 19
End: 2024-09-20

## 2024-09-20 ENCOUNTER — APPOINTMENT (EMERGENCY)
Dept: CT IMAGING | Facility: HOSPITAL | Age: 19
End: 2024-09-20

## 2024-09-20 VITALS
HEART RATE: 66 BPM | SYSTOLIC BLOOD PRESSURE: 107 MMHG | BODY MASS INDEX: 17.83 KG/M2 | DIASTOLIC BLOOD PRESSURE: 55 MMHG | TEMPERATURE: 98.3 F | WEIGHT: 138.89 LBS | RESPIRATION RATE: 16 BRPM | OXYGEN SATURATION: 96 %

## 2024-09-20 DIAGNOSIS — N23 URETERIC COLIC: Primary | ICD-10-CM

## 2024-09-20 LAB
ALBUMIN SERPL BCG-MCNC: 4.8 G/DL (ref 3.5–5)
ALP SERPL-CCNC: 87 U/L (ref 34–104)
ALT SERPL W P-5'-P-CCNC: 15 U/L (ref 7–52)
ANION GAP SERPL CALCULATED.3IONS-SCNC: 7 MMOL/L (ref 4–13)
AST SERPL W P-5'-P-CCNC: 19 U/L (ref 13–39)
BACTERIA UR QL AUTO: ABNORMAL /HPF
BASOPHILS # BLD AUTO: 0.07 THOUSANDS/ΜL (ref 0–0.1)
BASOPHILS NFR BLD AUTO: 1 % (ref 0–1)
BILIRUB SERPL-MCNC: 0.61 MG/DL (ref 0.2–1)
BILIRUB UR QL STRIP: NEGATIVE
BUN SERPL-MCNC: 20 MG/DL (ref 5–25)
CALCIUM SERPL-MCNC: 9.9 MG/DL (ref 8.4–10.2)
CHLORIDE SERPL-SCNC: 107 MMOL/L (ref 96–108)
CLARITY UR: CLEAR
CO2 SERPL-SCNC: 26 MMOL/L (ref 21–32)
COLOR UR: YELLOW
CREAT SERPL-MCNC: 0.95 MG/DL (ref 0.6–1.3)
EOSINOPHIL # BLD AUTO: 0.01 THOUSAND/ΜL (ref 0–0.61)
EOSINOPHIL NFR BLD AUTO: 0 % (ref 0–6)
ERYTHROCYTE [DISTWIDTH] IN BLOOD BY AUTOMATED COUNT: 12.7 % (ref 11.6–15.1)
GFR SERPL CREATININE-BSD FRML MDRD: 113 ML/MIN/1.73SQ M
GLUCOSE SERPL-MCNC: 121 MG/DL (ref 65–140)
GLUCOSE UR STRIP-MCNC: NEGATIVE MG/DL
HCT VFR BLD AUTO: 40.4 % (ref 36.5–49.3)
HGB BLD-MCNC: 14 G/DL (ref 12–17)
HGB UR QL STRIP.AUTO: ABNORMAL
IMM GRANULOCYTES # BLD AUTO: 0.04 THOUSAND/UL (ref 0–0.2)
IMM GRANULOCYTES NFR BLD AUTO: 0 % (ref 0–2)
KETONES UR STRIP-MCNC: ABNORMAL MG/DL
LEUKOCYTE ESTERASE UR QL STRIP: NEGATIVE
LIPASE SERPL-CCNC: 7 U/L (ref 11–82)
LYMPHOCYTES # BLD AUTO: 2.34 THOUSANDS/ΜL (ref 0.6–4.47)
LYMPHOCYTES NFR BLD AUTO: 18 % (ref 14–44)
MCH RBC QN AUTO: 30.8 PG (ref 26.8–34.3)
MCHC RBC AUTO-ENTMCNC: 34.7 G/DL (ref 31.4–37.4)
MCV RBC AUTO: 89 FL (ref 82–98)
MONOCYTES # BLD AUTO: 0.7 THOUSAND/ΜL (ref 0.17–1.22)
MONOCYTES NFR BLD AUTO: 5 % (ref 4–12)
MUCOUS THREADS UR QL AUTO: ABNORMAL
NEUTROPHILS # BLD AUTO: 10.04 THOUSANDS/ΜL (ref 1.85–7.62)
NEUTS SEG NFR BLD AUTO: 76 % (ref 43–75)
NITRITE UR QL STRIP: NEGATIVE
NON-SQ EPI CELLS URNS QL MICRO: ABNORMAL /HPF
NRBC BLD AUTO-RTO: 0 /100 WBCS
PH UR STRIP.AUTO: 7.5 [PH] (ref 4.5–8)
PLATELET # BLD AUTO: 242 THOUSANDS/UL (ref 149–390)
PMV BLD AUTO: 9.8 FL (ref 8.9–12.7)
POTASSIUM SERPL-SCNC: 4.5 MMOL/L (ref 3.5–5.3)
PROT SERPL-MCNC: 7.1 G/DL (ref 6.4–8.4)
PROT UR STRIP-MCNC: ABNORMAL MG/DL
RBC # BLD AUTO: 4.54 MILLION/UL (ref 3.88–5.62)
RBC #/AREA URNS AUTO: ABNORMAL /HPF
SODIUM SERPL-SCNC: 140 MMOL/L (ref 135–147)
SP GR UR STRIP.AUTO: 1.01 (ref 1–1.03)
UROBILINOGEN UR QL STRIP.AUTO: 0.2 E.U./DL
WBC # BLD AUTO: 13.2 THOUSAND/UL (ref 4.31–10.16)
WBC #/AREA URNS AUTO: ABNORMAL /HPF

## 2024-09-20 PROCEDURE — 96361 HYDRATE IV INFUSION ADD-ON: CPT

## 2024-09-20 PROCEDURE — 76705 ECHO EXAM OF ABDOMEN: CPT

## 2024-09-20 PROCEDURE — 36415 COLL VENOUS BLD VENIPUNCTURE: CPT | Performed by: EMERGENCY MEDICINE

## 2024-09-20 PROCEDURE — 96375 TX/PRO/DX INJ NEW DRUG ADDON: CPT

## 2024-09-20 PROCEDURE — 80053 COMPREHEN METABOLIC PANEL: CPT | Performed by: EMERGENCY MEDICINE

## 2024-09-20 PROCEDURE — 99284 EMERGENCY DEPT VISIT MOD MDM: CPT

## 2024-09-20 PROCEDURE — 81001 URINALYSIS AUTO W/SCOPE: CPT

## 2024-09-20 PROCEDURE — 96374 THER/PROPH/DIAG INJ IV PUSH: CPT

## 2024-09-20 PROCEDURE — 85025 COMPLETE CBC W/AUTO DIFF WBC: CPT | Performed by: EMERGENCY MEDICINE

## 2024-09-20 PROCEDURE — 83690 ASSAY OF LIPASE: CPT | Performed by: EMERGENCY MEDICINE

## 2024-09-20 PROCEDURE — 74177 CT ABD & PELVIS W/CONTRAST: CPT

## 2024-09-20 RX ORDER — KETOROLAC TROMETHAMINE 10 MG/1
10 TABLET, FILM COATED ORAL EVERY 6 HOURS PRN
Qty: 16 TABLET | Refills: 0 | Status: SHIPPED | OUTPATIENT
Start: 2024-09-20

## 2024-09-20 RX ORDER — TAMSULOSIN HYDROCHLORIDE 0.4 MG/1
0.4 CAPSULE ORAL
Qty: 5 CAPSULE | Refills: 0 | Status: SHIPPED | OUTPATIENT
Start: 2024-09-20 | End: 2024-09-25

## 2024-09-20 RX ORDER — ONDANSETRON 2 MG/ML
4 INJECTION INTRAMUSCULAR; INTRAVENOUS ONCE
Status: COMPLETED | OUTPATIENT
Start: 2024-09-20 | End: 2024-09-20

## 2024-09-20 RX ORDER — ONDANSETRON 4 MG/1
4 TABLET, FILM COATED ORAL EVERY 8 HOURS PRN
Qty: 7 TABLET | Refills: 0 | Status: SHIPPED | OUTPATIENT
Start: 2024-09-20

## 2024-09-20 RX ORDER — KETOROLAC TROMETHAMINE 30 MG/ML
15 INJECTION, SOLUTION INTRAMUSCULAR; INTRAVENOUS ONCE
Status: COMPLETED | OUTPATIENT
Start: 2024-09-20 | End: 2024-09-20

## 2024-09-20 RX ADMIN — ONDANSETRON 4 MG: 2 INJECTION INTRAMUSCULAR; INTRAVENOUS at 05:16

## 2024-09-20 RX ADMIN — KETOROLAC TROMETHAMINE 15 MG: 30 INJECTION, SOLUTION INTRAMUSCULAR; INTRAVENOUS at 05:36

## 2024-09-20 RX ADMIN — SODIUM CHLORIDE 1000 ML: 0.9 INJECTION, SOLUTION INTRAVENOUS at 05:18

## 2024-09-20 RX ADMIN — IOHEXOL 80 ML: 350 INJECTION, SOLUTION INTRAVENOUS at 06:34

## 2024-09-20 NOTE — Clinical Note
Jourdan Martinez was seen and treated in our emergency department on 9/20/2024.    No restrictions            Diagnosis:     Jourdan  may return to work on return date.    He may return on this date: 09/22/2024         If you have any questions or concerns, please don't hesitate to call.      Jc Corona MD    ______________________________           _______________          _______________  Hospital Representative                              Date                                Time

## 2024-09-20 NOTE — ED PROVIDER NOTES
No diagnosis found.  ED Disposition       None          Assessment & Plan       Medical Decision Making  - Patient pale and uncomfortable on exam.  Actively vomiting prior to my evaluation which improved with Zofran that was ordered per first nurse protocol.  No prior history of abdominal surgeries that would lead to complications such as bowel obstruction, volvulus, etc.  No vascular history, hypertension, Marfan's or other risk factors for dissection or AAA.  No testicular pain to suggest torsion.  No skin changes to suggest shingles.  No known history of familial diverticulosis or polyposis disease.  No prior EGD or colonoscopy.  No modifying factors from foods to suggest possible allergen or intolerance.  Patient does report daily marijuana use but states he is never had side effects like this before.    - given the presentation, will check CBC for marked leukocytosis  - CMP for liver enzyme elevation that could signal cholecystitis, biliary obstructive disease. Check RFTs for NEGRA / markers of dehydration.  - Lipase given abdominal pain to evaluate specifically for pancreatitis.  - Urine: will check for UTI or signs of pyelonephritis.   - CT AP w Contrast: r/o appendicitis, cholecystitis, bowel obstruction or other acute abdominal pathology. Would also demonstrate signs of pyelonephritis, cystitis.    -Will give Toradol for pain.  IV fluids for hydration.  If no improvement then we will try Haldol given his marijuana use.  - Disposition per workup.     6:33 AM  Patient much improved at this time.  No pain, nausea or vomiting.  Labs are overall normal and appears to be at baseline.  Slight leukocytosis but has had this in the past.  CT is pending.  Will sign the patient out to the oncoming physician to follow-up on CT and disposition.    Amount and/or Complexity of Data Reviewed  Labs: ordered. Decision-making details documented in ED Course.  Radiology: ordered.    Risk  Prescription drug management.                 ED Course as of 09/20/24 0634   Fri Sep 20, 2024   0553 Lipase(!)  Normal    0553 Comprehensive metabolic panel  Normal    0554 CBC and differential(!)  Nonspecific leukocytosis.  Patient has had elevations multiple times in the past of his white blood cell count.  Possible infectious.  CT is pending.  Urine is pending.       Medications   iohexol (OMNIPAQUE) 350 MG/ML injection (MULTI-DOSE) 80 mL (has no administration in time range)   ondansetron (ZOFRAN) injection 4 mg (4 mg Intravenous Given 9/20/24 0516)   sodium chloride 0.9 % bolus 1,000 mL (0 mL Intravenous Stopped 9/20/24 0614)   ketorolac (TORADOL) injection 15 mg (15 mg Intravenous Given 9/20/24 0536)       History of Present Illness       Patient is a 19-year-old male with a past medical history of pneumonia with sepsis and no major past surgical history.  Presents with a few hours of an abrupt onset of lower abdominal pain rating to his back with multiple episodes of nausea, vomiting and 1 episode of diarrhea.  Patient denies any urinary symptoms, fevers or chills.  States that he had a frozen meal a few hours prior to this.  No known sick contacts.  Has never had symptoms like this before.  No known history of gallstones, kidney stones and he still has his appendix.  No treatment prior to arrival.      History provided by:  Patient and parent   used: No    Abdominal Pain  Associated symptoms: diarrhea, fatigue, nausea and vomiting    Associated symptoms: no chest pain, no chills, no cough, no dysuria, no fever, no hematuria and no shortness of breath        Review of Systems   Constitutional:  Positive for appetite change and fatigue. Negative for chills and fever.   Eyes:  Negative for visual disturbance.   Respiratory:  Negative for cough, chest tightness and shortness of breath.    Cardiovascular:  Negative for chest pain and leg swelling.   Gastrointestinal:  Positive for abdominal pain, diarrhea, nausea and vomiting.  Negative for blood in stool.   Genitourinary:  Negative for dysuria and hematuria.   Musculoskeletal:  Negative for back pain and neck pain.   Skin:  Negative for rash and wound.   Allergic/Immunologic: Negative for immunocompromised state.   Neurological:  Negative for syncope.   All other systems reviewed and are negative.          Objective     ED Triage Vitals [09/20/24 0504]   Temperature Pulse Blood Pressure Respirations SpO2 Patient Position - Orthostatic VS   98.3 °F (36.8 °C) 57 130/64 16 97 % --      Temp Source Heart Rate Source BP Location FiO2 (%) Pain Score    Oral -- -- -- 10 - Worst Possible Pain        Physical Exam  Vitals and nursing note reviewed.   Constitutional:       General: He is in acute distress.      Appearance: He is well-developed. He is ill-appearing. He is not toxic-appearing or diaphoretic.   HENT:      Head: Normocephalic and atraumatic.      Right Ear: External ear normal.      Left Ear: External ear normal.      Nose: No congestion.   Eyes:      General: No scleral icterus.     Conjunctiva/sclera: Conjunctivae normal.      Right eye: Right conjunctiva is not injected.      Left eye: Left conjunctiva is not injected.   Neck:      Trachea: No tracheal deviation.   Cardiovascular:      Rate and Rhythm: Bradycardia present.      Pulses: Normal pulses.   Pulmonary:      Effort: Pulmonary effort is normal. No respiratory distress.      Breath sounds: No stridor.   Abdominal:      Palpations: Abdomen is soft.      Tenderness: There is abdominal tenderness in the right lower quadrant, suprapubic area and left lower quadrant.   Skin:     General: Skin is warm and dry.      Capillary Refill: Capillary refill takes less than 2 seconds.      Coloration: Skin is pale.      Findings: No erythema or rash.   Neurological:      Mental Status: He is alert and oriented to person, place, and time.      Motor: No abnormal muscle tone.   Psychiatric:         Mood and Affect: Mood normal.          Behavior: Behavior normal.         Labs Reviewed   CBC AND DIFFERENTIAL - Abnormal       Result Value    WBC 13.20 (*)     RBC 4.54      Hemoglobin 14.0      Hematocrit 40.4      MCV 89      MCH 30.8      MCHC 34.7      RDW 12.7      MPV 9.8      Platelets 242      nRBC 0      Segmented % 76 (*)     Immature Grans % 0      Lymphocytes % 18      Monocytes % 5      Eosinophils Relative 0      Basophils Relative 1      Absolute Neutrophils 10.04 (*)     Absolute Immature Grans 0.04      Absolute Lymphocytes 2.34      Absolute Monocytes 0.70      Eosinophils Absolute 0.01      Basophils Absolute 0.07     LIPASE - Abnormal    Lipase 7 (*)    COMPREHENSIVE METABOLIC PANEL    Sodium 140      Potassium 4.5      Chloride 107      CO2 26      ANION GAP 7      BUN 20      Creatinine 0.95      Glucose 121      Calcium 9.9      AST 19      ALT 15      Alkaline Phosphatase 87      Total Protein 7.1      Albumin 4.8      Total Bilirubin 0.61      eGFR 113      Narrative:     National Kidney Disease Foundation guidelines for Chronic Kidney Disease (CKD):     Stage 1 with normal or high GFR (GFR > 90 mL/min/1.73 square meters)    Stage 2 Mild CKD (GFR = 60-89 mL/min/1.73 square meters)    Stage 3A Moderate CKD (GFR = 45-59 mL/min/1.73 square meters)    Stage 3B Moderate CKD (GFR = 30-44 mL/min/1.73 square meters)    Stage 4 Severe CKD (GFR = 15-29 mL/min/1.73 square meters)    Stage 5 End Stage CKD (GFR <15 mL/min/1.73 square meters)  Note: GFR calculation is accurate only with a steady state creatinine     CT abdomen pelvis with contrast    (Results Pending)       Procedures    ED Medication and Procedure Management   None     Patient's Medications    No medications on file     No discharge procedures on file.     Moshe Ye Jr., DO  09/20/24 0634

## 2024-09-20 NOTE — QUICK NOTE
Ultrasound negative for cholecystitis/cholelithiasis, work up consistent with noninfected kidney stone, will tx symptoms, urology f/u

## 2024-09-21 ENCOUNTER — HOSPITAL ENCOUNTER (EMERGENCY)
Facility: HOSPITAL | Age: 19
Discharge: HOME | End: 2024-09-21
Attending: EMERGENCY MEDICINE

## 2024-09-21 VITALS
DIASTOLIC BLOOD PRESSURE: 55 MMHG | OXYGEN SATURATION: 99 % | HEART RATE: 69 BPM | SYSTOLIC BLOOD PRESSURE: 114 MMHG | TEMPERATURE: 98.4 F | RESPIRATION RATE: 14 BRPM

## 2024-09-21 DIAGNOSIS — N20.0 NEPHROLITHIASIS: ICD-10-CM

## 2024-09-21 DIAGNOSIS — R11.2 NAUSEA AND VOMITING, UNSPECIFIED VOMITING TYPE: Primary | ICD-10-CM

## 2024-09-21 DIAGNOSIS — N23 RENAL COLIC ON LEFT SIDE: ICD-10-CM

## 2024-09-21 LAB
ALBUMIN SERPL BCG-MCNC: 4.5 G/DL (ref 3.5–5)
ALP SERPL-CCNC: 93 U/L (ref 34–104)
ALT SERPL W P-5'-P-CCNC: 19 U/L (ref 7–52)
ANION GAP SERPL CALCULATED.3IONS-SCNC: 8 MMOL/L (ref 4–13)
AST SERPL W P-5'-P-CCNC: 22 U/L (ref 13–39)
BACTERIA UR QL AUTO: ABNORMAL /HPF
BASOPHILS # BLD AUTO: 0.05 THOUSANDS/ΜL (ref 0–0.1)
BASOPHILS NFR BLD AUTO: 0 % (ref 0–1)
BILIRUB SERPL-MCNC: 0.85 MG/DL (ref 0.2–1)
BILIRUB UR QL STRIP: NEGATIVE
BUDDING YEAST: PRESENT
BUN SERPL-MCNC: 19 MG/DL (ref 5–25)
CALCIUM SERPL-MCNC: 9.9 MG/DL (ref 8.4–10.2)
CHLORIDE SERPL-SCNC: 108 MMOL/L (ref 96–108)
CLARITY UR: ABNORMAL
CO2 SERPL-SCNC: 23 MMOL/L (ref 21–32)
COLOR UR: YELLOW
CREAT SERPL-MCNC: 1.26 MG/DL (ref 0.6–1.3)
EOSINOPHIL # BLD AUTO: 0.01 THOUSAND/ΜL (ref 0–0.61)
EOSINOPHIL NFR BLD AUTO: 0 % (ref 0–6)
ERYTHROCYTE [DISTWIDTH] IN BLOOD BY AUTOMATED COUNT: 12.8 % (ref 11.6–15.1)
GFR SERPL CREATININE-BSD FRML MDRD: 80 ML/MIN/1.73SQ M
GLUCOSE SERPL-MCNC: 98 MG/DL (ref 65–140)
GLUCOSE UR STRIP-MCNC: NEGATIVE MG/DL
HCT VFR BLD AUTO: 39.1 % (ref 36.5–49.3)
HGB BLD-MCNC: 13.7 G/DL (ref 12–17)
HGB UR QL STRIP.AUTO: ABNORMAL
IMM GRANULOCYTES # BLD AUTO: 0.08 THOUSAND/UL (ref 0–0.2)
IMM GRANULOCYTES NFR BLD AUTO: 1 % (ref 0–2)
KETONES UR STRIP-MCNC: ABNORMAL MG/DL
LEUKOCYTE ESTERASE UR QL STRIP: NEGATIVE
LYMPHOCYTES # BLD AUTO: 1.79 THOUSANDS/ΜL (ref 0.6–4.47)
LYMPHOCYTES NFR BLD AUTO: 12 % (ref 14–44)
MCH RBC QN AUTO: 31.6 PG (ref 26.8–34.3)
MCHC RBC AUTO-ENTMCNC: 35 G/DL (ref 31.4–37.4)
MCV RBC AUTO: 90 FL (ref 82–98)
MONOCYTES # BLD AUTO: 0.89 THOUSAND/ΜL (ref 0.17–1.22)
MONOCYTES NFR BLD AUTO: 6 % (ref 4–12)
MUCOUS THREADS UR QL AUTO: ABNORMAL
NEUTROPHILS # BLD AUTO: 12.28 THOUSANDS/ΜL (ref 1.85–7.62)
NEUTS SEG NFR BLD AUTO: 81 % (ref 43–75)
NITRITE UR QL STRIP: NEGATIVE
NON-SQ EPI CELLS URNS QL MICRO: ABNORMAL /HPF
NRBC BLD AUTO-RTO: 0 /100 WBCS
PH UR STRIP.AUTO: 7.5 [PH]
PLATELET # BLD AUTO: 245 THOUSANDS/UL (ref 149–390)
PMV BLD AUTO: 10.4 FL (ref 8.9–12.7)
POTASSIUM SERPL-SCNC: 4.2 MMOL/L (ref 3.5–5.3)
PROT SERPL-MCNC: 6.9 G/DL (ref 6.4–8.4)
PROT UR STRIP-MCNC: ABNORMAL MG/DL
RBC # BLD AUTO: 4.33 MILLION/UL (ref 3.88–5.62)
RBC #/AREA URNS AUTO: ABNORMAL /HPF
SODIUM SERPL-SCNC: 139 MMOL/L (ref 135–147)
SP GR UR STRIP.AUTO: 1.03 (ref 1–1.03)
UROBILINOGEN UR STRIP-ACNC: 2 MG/DL
WBC # BLD AUTO: 15.1 THOUSAND/UL (ref 4.31–10.16)
WBC #/AREA URNS AUTO: ABNORMAL /HPF

## 2024-09-21 PROCEDURE — 96375 TX/PRO/DX INJ NEW DRUG ADDON: CPT

## 2024-09-21 PROCEDURE — 96366 THER/PROPH/DIAG IV INF ADDON: CPT

## 2024-09-21 PROCEDURE — 80053 COMPREHEN METABOLIC PANEL: CPT | Performed by: EMERGENCY MEDICINE

## 2024-09-21 PROCEDURE — 99284 EMERGENCY DEPT VISIT MOD MDM: CPT | Performed by: EMERGENCY MEDICINE

## 2024-09-21 PROCEDURE — 85025 COMPLETE CBC W/AUTO DIFF WBC: CPT | Performed by: EMERGENCY MEDICINE

## 2024-09-21 PROCEDURE — 36415 COLL VENOUS BLD VENIPUNCTURE: CPT | Performed by: EMERGENCY MEDICINE

## 2024-09-21 PROCEDURE — 96365 THER/PROPH/DIAG IV INF INIT: CPT

## 2024-09-21 PROCEDURE — 81001 URINALYSIS AUTO W/SCOPE: CPT | Performed by: EMERGENCY MEDICINE

## 2024-09-21 PROCEDURE — 99283 EMERGENCY DEPT VISIT LOW MDM: CPT

## 2024-09-21 RX ORDER — KETOROLAC TROMETHAMINE 30 MG/ML
15 INJECTION, SOLUTION INTRAMUSCULAR; INTRAVENOUS ONCE
Status: COMPLETED | OUTPATIENT
Start: 2024-09-21 | End: 2024-09-21

## 2024-09-21 RX ORDER — OXYCODONE HYDROCHLORIDE 5 MG/1
5 TABLET ORAL EVERY 4 HOURS PRN
Qty: 4 TABLET | Refills: 0 | Status: SHIPPED | OUTPATIENT
Start: 2024-09-21

## 2024-09-21 RX ORDER — MORPHINE SULFATE 4 MG/ML
4 INJECTION, SOLUTION INTRAMUSCULAR; INTRAVENOUS ONCE
Status: COMPLETED | OUTPATIENT
Start: 2024-09-21 | End: 2024-09-21

## 2024-09-21 RX ORDER — ONDANSETRON 2 MG/ML
4 INJECTION INTRAMUSCULAR; INTRAVENOUS ONCE
Status: COMPLETED | OUTPATIENT
Start: 2024-09-21 | End: 2024-09-21

## 2024-09-21 RX ADMIN — MORPHINE SULFATE 4 MG: 4 INJECTION INTRAVENOUS at 17:35

## 2024-09-21 RX ADMIN — ONDANSETRON 4 MG: 2 INJECTION INTRAMUSCULAR; INTRAVENOUS at 17:38

## 2024-09-21 RX ADMIN — KETOROLAC TROMETHAMINE 15 MG: 30 INJECTION, SOLUTION INTRAMUSCULAR; INTRAVENOUS at 17:34

## 2024-09-21 RX ADMIN — SODIUM CHLORIDE, SODIUM LACTATE, POTASSIUM CHLORIDE, AND CALCIUM CHLORIDE 1000 ML: .6; .31; .03; .02 INJECTION, SOLUTION INTRAVENOUS at 17:40

## 2024-09-21 NOTE — ED PROVIDER NOTES
1. Nausea and vomiting, unspecified vomiting type    2. Renal colic on left side    3. Nephrolithiasis      ED Disposition       ED Disposition   Discharge    Condition   Stable    Date/Time   Sat Sep 21, 2024  8:09 PM    Comment   Jourdan Martinez discharge to home/self care.                   Assessment & Plan       Medical Decision Making  Amount and/or Complexity of Data Reviewed  Labs: ordered. Decision-making details documented in ED Course.    Risk  Prescription drug management.      Patient seen earlier today and found to have: a 3 mm calculus in the distal left ureter proximal to the UVJ.  Mild hydroureteronephrosis.  No perinephric collection.  Also noted to have mildly edematous gallbladder wall with minimal intrahepatic periportal edema.  Likely due to aggressive IV hydration.  His right upper quadrant ultrasound showed nonspecific gallbladder wall thickening without any gallstones or sonographic Nath sign.    Labs showed normal electrolytes.  Renal function was at baseline.  Patient did have a leukocytosis.  Had hematuria and ketonuria without signs of urinary tract infection.  It appears he was discharged in stable condition with symptoms that were improved.    His signs symptoms are consistent with his known nephrolithiasis.  Plan to repeat labs to evaluate for any change given abnormal findings on CT/red quadrant ultrasound.  If there are any significant changes we will repeat imaging as needed.  Will treat supportively with IV fluids, pain medication.          ED Course as of 09/21/24 2104   Sat Sep 21, 2024   1845 AST: 22   1846 ALT: 19   1846 Total Bilirubin: 0.85   1846 LFTs are unchanged from earlier today.   1846 Creatinine: 1.26  In the setting of not being able to tolerate p.o.   1846 WBC(!): 15.10  Patient is afebrile   1846 On reassessment patient is significantly improved.  He is tolerating p.o.  He is receiving IV fluids.  Will continue to monitor.  He has no right upper quadrant  tenderness.  No further indication for imaging right upper quadrant given findings from this morning.   1959 Urinalysis with microscopic(!)  Not consistent with UTI   2012 Patient has remained symptom-free since initial medications were given.  He appears nontoxic, is resting comfortably, and is in no acute distress.  He has been able to tolerate p.o.  He has no abdominal tenderness.  He does have a referral for urology and will call the office.  He understands return if he develops fever or refractory nausea and vomiting.  I have written him a short course for pain medication for him to take over the next day as needed.       Medications   ketorolac (TORADOL) injection 15 mg (15 mg Intravenous Given 9/21/24 1734)   ondansetron (ZOFRAN) injection 4 mg (4 mg Intravenous Given 9/21/24 1738)   lactated ringers bolus 1,000 mL (0 mL Intravenous Stopped 9/21/24 2004)   morphine injection 4 mg (4 mg Intravenous Given 9/21/24 1735)       History of Present Illness         Vomiting  Associated symptoms: abdominal pain    Associated symptoms: no cough, no fever and no headaches    19-year-old male presents emerged department for evaluation of nausea, vomiting, abdominal pain.  Patient was seen earlier today in the emergency department and diagnosed with nephrolithiasis.  Patient states that he has been experiencing pain in his left lower abdominal area and in his back similar to what he felt previously.  Patient had not been able to eat and states his medications that he have something with food he has not been able to hold down the medications.  Family note that he has been vomiting and appears pale.  He feels like his breath is slowing down at times.  Denying any fevers.  He has been urinating, denies seeing any blood.    Review of Systems   Constitutional:  Positive for appetite change. Negative for fever.   Respiratory:  Negative for cough and shortness of breath.    Cardiovascular:  Negative for chest pain.    Gastrointestinal:  Positive for abdominal pain, nausea and vomiting.   Genitourinary:  Negative for decreased urine volume, difficulty urinating and hematuria.   Neurological:  Negative for weakness, light-headedness and headaches.           Objective     ED Triage Vitals [09/21/24 1656]   Temperature Pulse Blood Pressure Respirations SpO2 Patient Position - Orthostatic VS   98.4 °F (36.9 °C) 62 119/53 18 99 % --      Temp Source Heart Rate Source BP Location FiO2 (%) Pain Score    Oral Monitor -- -- 6        Physical Exam  Vitals and nursing note reviewed.   Constitutional:       General: He is in acute distress.      Appearance: He is ill-appearing.   HENT:      Head: Normocephalic and atraumatic.      Right Ear: External ear normal.      Left Ear: External ear normal.      Nose: Nose normal.   Eyes:      Pupils: Pupils are equal, round, and reactive to light.   Cardiovascular:      Pulses: Normal pulses.   Pulmonary:      Effort: Pulmonary effort is normal.      Breath sounds: Normal breath sounds.   Abdominal:      General: Abdomen is flat.      Palpations: Abdomen is soft.      Comments: Patient denied any right upper quadrant abdominal tenderness.   Musculoskeletal:         General: Normal range of motion.      Cervical back: Normal range of motion and neck supple.      Right lower leg: No edema.      Left lower leg: No edema.   Skin:     General: Skin is warm and dry.      Capillary Refill: Capillary refill takes less than 2 seconds.   Neurological:      General: No focal deficit present.      Mental Status: He is alert.   Psychiatric:         Mood and Affect: Mood normal.         Labs Reviewed   CBC AND DIFFERENTIAL - Abnormal       Result Value    WBC 15.10 (*)     RBC 4.33      Hemoglobin 13.7      Hematocrit 39.1      MCV 90      MCH 31.6      MCHC 35.0      RDW 12.8      MPV 10.4      Platelets 245      nRBC 0      Segmented % 81 (*)     Immature Grans % 1      Lymphocytes % 12 (*)     Monocytes % 6       Eosinophils Relative 0      Basophils Relative 0      Absolute Neutrophils 12.28 (*)     Absolute Immature Grans 0.08      Absolute Lymphocytes 1.79      Absolute Monocytes 0.89      Eosinophils Absolute 0.01      Basophils Absolute 0.05     URINALYSIS WITH MICROSCOPIC - Abnormal    Color, UA Yellow      Clarity, UA Turbid      Specific Gravity, UA 1.029      pH, UA 7.5      Leukocytes, UA Negative      Nitrite, UA Negative      Protein, UA Trace (*)     Glucose, UA Negative      Ketones, UA 40 (2+) (*)     Urobilinogen, UA 2.0 (*)     Bilirubin, UA Negative      Occult Blood, UA Small (*)     RBC, UA 30-50 (*)     WBC, UA None Seen      Epithelial Cells None Seen      Bacteria, UA None Seen      MUCUS THREADS Innumerable (*)     Budding Yeast Present     COMPREHENSIVE METABOLIC PANEL    Sodium 139      Potassium 4.2      Chloride 108      CO2 23      ANION GAP 8      BUN 19      Creatinine 1.26      Glucose 98      Calcium 9.9      AST 22      ALT 19      Alkaline Phosphatase 93      Total Protein 6.9      Albumin 4.5      Total Bilirubin 0.85      eGFR 80      Narrative:     National Kidney Disease Foundation guidelines for Chronic Kidney Disease (CKD):     Stage 1 with normal or high GFR (GFR > 90 mL/min/1.73 square meters)    Stage 2 Mild CKD (GFR = 60-89 mL/min/1.73 square meters)    Stage 3A Moderate CKD (GFR = 45-59 mL/min/1.73 square meters)    Stage 3B Moderate CKD (GFR = 30-44 mL/min/1.73 square meters)    Stage 4 Severe CKD (GFR = 15-29 mL/min/1.73 square meters)    Stage 5 End Stage CKD (GFR <15 mL/min/1.73 square meters)  Note: GFR calculation is accurate only with a steady state creatinine     No orders to display       Procedures    ED Medication and Procedure Management   Prior to Admission Medications   Prescriptions Last Dose Informant Patient Reported? Taking?   ketorolac (TORADOL) 10 mg tablet   No No   Sig: Take 1 tablet (10 mg total) by mouth every 6 (six) hours as needed for moderate pain    ondansetron (ZOFRAN) 4 mg tablet   No No   Sig: Take 1 tablet (4 mg total) by mouth every 8 (eight) hours as needed for nausea or vomiting for up to 7 doses   tamsulosin (FLOMAX) 0.4 mg   No No   Sig: Take 1 capsule (0.4 mg total) by mouth daily with dinner for 5 days      Facility-Administered Medications: None     Discharge Medication List as of 9/21/2024  8:12 PM        START taking these medications    Details   oxyCODONE (Roxicodone) 5 immediate release tablet Take 1 tablet (5 mg total) by mouth every 4 (four) hours as needed for severe pain for up to 4 doses Max Daily Amount: 30 mg, Starting Sat 9/21/2024, Print           CONTINUE these medications which have NOT CHANGED    Details   ketorolac (TORADOL) 10 mg tablet Take 1 tablet (10 mg total) by mouth every 6 (six) hours as needed for moderate pain, Starting Fri 9/20/2024, Normal      ondansetron (ZOFRAN) 4 mg tablet Take 1 tablet (4 mg total) by mouth every 8 (eight) hours as needed for nausea or vomiting for up to 7 doses, Starting Fri 9/20/2024, Normal      tamsulosin (FLOMAX) 0.4 mg Take 1 capsule (0.4 mg total) by mouth daily with dinner for 5 days, Starting Fri 9/20/2024, Until Wed 9/25/2024, Normal           No discharge procedures on file.     Addy Dunlap [place] Arya, DO  09/21/24 [id number]

## 2024-09-21 NOTE — Clinical Note
Jourdan Martinez was seen and treated in our emergency department on 9/21/2024.    No restrictions            Diagnosis:     Jourdan  may return to work on return date.    He may return on this date: 09/23/2024         If you have any questions or concerns, please don't hesitate to call.      Addy Koenig, DO    ______________________________           _______________          _______________  Hospital Representative                              Date                                Time

## 2024-09-22 NOTE — ED NOTES
Pt discharged by ED provider Arya. This RN not at the bedside. Pt ambulatory out of dept     Annika Hill RN  09/21/24 2021

## 2024-09-26 ENCOUNTER — TELEPHONE (OUTPATIENT)
Age: 19
End: 2024-09-26

## 2024-09-26 NOTE — TELEPHONE ENCOUNTER
New pt self pay for upcoming appt 10/16/24. Please review estimated cost for self pay appt. Spoke with pt mother and while in ED pt was given financial aide info and did apply.     Pt call [phone]

## 2024-09-26 NOTE — TELEPHONE ENCOUNTER
New Patient    What is the reason for the patient’s appointment?: Ureteric colic     What office location does the patient prefer?: [place]     Does patient have Imaging/Lab Results: labs CT and US 9/2024    Have patient records been requested?: records in epic   If No, are the records showing in Epic:       HISTORY:   Has the patient had any previous Urologist(s)?: no    Was the patient seen in the ED?: 9/20 and 9/21/24    Has the patient had any outside testing done?: no     Pt mother called and stated no active systems at the moment but sometimes gets some pain but ED gave pt pain meds to take.